# Patient Record
Sex: MALE | Race: WHITE | Employment: UNEMPLOYED | URBAN - METROPOLITAN AREA
[De-identification: names, ages, dates, MRNs, and addresses within clinical notes are randomized per-mention and may not be internally consistent; named-entity substitution may affect disease eponyms.]

---

## 2019-05-01 ENCOUNTER — OFFICE VISIT (OUTPATIENT)
Dept: PSYCHOLOGY | Age: 29
End: 2019-05-01
Payer: COMMERCIAL

## 2019-05-01 ENCOUNTER — OFFICE VISIT (OUTPATIENT)
Dept: INTERNAL MEDICINE CLINIC | Age: 29
End: 2019-05-01
Payer: COMMERCIAL

## 2019-05-01 VITALS
DIASTOLIC BLOOD PRESSURE: 80 MMHG | HEIGHT: 69 IN | BODY MASS INDEX: 39.55 KG/M2 | SYSTOLIC BLOOD PRESSURE: 120 MMHG | WEIGHT: 267 LBS | HEART RATE: 80 BPM

## 2019-05-01 DIAGNOSIS — F12.90 MARIJUANA USE: ICD-10-CM

## 2019-05-01 DIAGNOSIS — F43.29 ADJUSTMENT DISORDER WITH EMOTIONAL DISTURBANCE: ICD-10-CM

## 2019-05-01 DIAGNOSIS — G47.33 OSA (OBSTRUCTIVE SLEEP APNEA): ICD-10-CM

## 2019-05-01 DIAGNOSIS — F12.20 CANNABIS USE DISORDER, MODERATE, DEPENDENCE (HCC): ICD-10-CM

## 2019-05-01 DIAGNOSIS — E66.9 CLASS 2 OBESITY WITH BODY MASS INDEX (BMI) OF 39.0 TO 39.9 IN ADULT, UNSPECIFIED OBESITY TYPE, UNSPECIFIED WHETHER SERIOUS COMORBIDITY PRESENT: ICD-10-CM

## 2019-05-01 DIAGNOSIS — F33.1 MODERATE EPISODE OF RECURRENT MAJOR DEPRESSIVE DISORDER (HCC): Primary | ICD-10-CM

## 2019-05-01 DIAGNOSIS — F32.A DEPRESSION, UNSPECIFIED DEPRESSION TYPE: ICD-10-CM

## 2019-05-01 DIAGNOSIS — Z13.31 POSITIVE DEPRESSION SCREENING: ICD-10-CM

## 2019-05-01 DIAGNOSIS — F41.1 GAD (GENERALIZED ANXIETY DISORDER): ICD-10-CM

## 2019-05-01 DIAGNOSIS — F32.A DEPRESSION, UNSPECIFIED DEPRESSION TYPE: Primary | ICD-10-CM

## 2019-05-01 LAB
A/G RATIO: 1.6 (ref 1.1–2.2)
ALBUMIN SERPL-MCNC: 4.5 G/DL (ref 3.4–5)
ALP BLD-CCNC: 55 U/L (ref 40–129)
ALT SERPL-CCNC: 25 U/L (ref 10–40)
ANION GAP SERPL CALCULATED.3IONS-SCNC: 13 MMOL/L (ref 3–16)
AST SERPL-CCNC: 20 U/L (ref 15–37)
BILIRUB SERPL-MCNC: 0.5 MG/DL (ref 0–1)
BILIRUBIN URINE: NEGATIVE
BLOOD, URINE: NEGATIVE
BUN BLDV-MCNC: 14 MG/DL (ref 7–20)
CALCIUM SERPL-MCNC: 9.4 MG/DL (ref 8.3–10.6)
CHLORIDE BLD-SCNC: 101 MMOL/L (ref 99–110)
CLARITY: CLEAR
CO2: 24 MMOL/L (ref 21–32)
COLOR: YELLOW
CREAT SERPL-MCNC: 0.9 MG/DL (ref 0.9–1.3)
FOLATE: 8.15 NG/ML (ref 4.78–24.2)
GFR AFRICAN AMERICAN: >60
GFR NON-AFRICAN AMERICAN: >60
GLOBULIN: 2.9 G/DL
GLUCOSE BLD-MCNC: 92 MG/DL (ref 70–99)
GLUCOSE URINE: NEGATIVE MG/DL
HCT VFR BLD CALC: 43.8 % (ref 40.5–52.5)
HEMOGLOBIN: 14.6 G/DL (ref 13.5–17.5)
KETONES, URINE: NEGATIVE MG/DL
LEUKOCYTE ESTERASE, URINE: NEGATIVE
MCH RBC QN AUTO: 28.6 PG (ref 26–34)
MCHC RBC AUTO-ENTMCNC: 33.3 G/DL (ref 31–36)
MCV RBC AUTO: 85.9 FL (ref 80–100)
MICROSCOPIC EXAMINATION: NORMAL
NITRITE, URINE: NEGATIVE
PDW BLD-RTO: 13 % (ref 12.4–15.4)
PH UA: 5.5 (ref 5–8)
PLATELET # BLD: 253 K/UL (ref 135–450)
PMV BLD AUTO: 9.4 FL (ref 5–10.5)
POTASSIUM SERPL-SCNC: 4 MMOL/L (ref 3.5–5.1)
PROTEIN UA: NEGATIVE MG/DL
RBC # BLD: 5.1 M/UL (ref 4.2–5.9)
SODIUM BLD-SCNC: 138 MMOL/L (ref 136–145)
SPECIFIC GRAVITY UA: 1.03 (ref 1–1.03)
T4 FREE: 1 NG/DL (ref 0.9–1.8)
TOTAL PROTEIN: 7.4 G/DL (ref 6.4–8.2)
TSH REFLEX: 4.72 UIU/ML (ref 0.27–4.2)
URINE TYPE: NORMAL
UROBILINOGEN, URINE: 0.2 E.U./DL
VITAMIN B-12: 725 PG/ML (ref 211–911)
WBC # BLD: 8.5 K/UL (ref 4–11)

## 2019-05-01 PROCEDURE — 96160 PT-FOCUSED HLTH RISK ASSMT: CPT | Performed by: INTERNAL MEDICINE

## 2019-05-01 PROCEDURE — 99203 OFFICE O/P NEW LOW 30 MIN: CPT | Performed by: INTERNAL MEDICINE

## 2019-05-01 PROCEDURE — 90791 PSYCH DIAGNOSTIC EVALUATION: CPT | Performed by: PSYCHOLOGIST

## 2019-05-01 PROCEDURE — G8431 POS CLIN DEPRES SCRN F/U DOC: HCPCS | Performed by: INTERNAL MEDICINE

## 2019-05-01 ASSESSMENT — ENCOUNTER SYMPTOMS
SHORTNESS OF BREATH: 0
VOICE CHANGE: 0
NAUSEA: 0
TROUBLE SWALLOWING: 0
ABDOMINAL PAIN: 0
CHEST TIGHTNESS: 0
WHEEZING: 0

## 2019-05-01 ASSESSMENT — PATIENT HEALTH QUESTIONNAIRE - PHQ9
SUM OF ALL RESPONSES TO PHQ9 QUESTIONS 1 & 2: 5
SUM OF ALL RESPONSES TO PHQ QUESTIONS 1-9: 22
6. FEELING BAD ABOUT YOURSELF - OR THAT YOU ARE A FAILURE OR HAVE LET YOURSELF OR YOUR FAMILY DOWN: 3
7. TROUBLE CONCENTRATING ON THINGS, SUCH AS READING THE NEWSPAPER OR WATCHING TELEVISION: 3
2. FEELING DOWN, DEPRESSED OR HOPELESS: 2
10. IF YOU CHECKED OFF ANY PROBLEMS, HOW DIFFICULT HAVE THESE PROBLEMS MADE IT FOR YOU TO DO YOUR WORK, TAKE CARE OF THINGS AT HOME, OR GET ALONG WITH OTHER PEOPLE: 2
4. FEELING TIRED OR HAVING LITTLE ENERGY: 3
5. POOR APPETITE OR OVEREATING: 3
8. MOVING OR SPEAKING SO SLOWLY THAT OTHER PEOPLE COULD HAVE NOTICED. OR THE OPPOSITE, BEING SO FIGETY OR RESTLESS THAT YOU HAVE BEEN MOVING AROUND A LOT MORE THAN USUAL: 3
3. TROUBLE FALLING OR STAYING ASLEEP: 1
1. LITTLE INTEREST OR PLEASURE IN DOING THINGS: 3
9. THOUGHTS THAT YOU WOULD BE BETTER OFF DEAD, OR OF HURTING YOURSELF: 1
SUM OF ALL RESPONSES TO PHQ QUESTIONS 1-9: 22

## 2019-05-01 NOTE — PROGRESS NOTES
Attends meetings of clubs or organizations: None     Relationship status: None    Intimate partner violence:     Fear of current or ex partner: None     Emotionally abused: None     Physically abused: None     Forced sexual activity: None   Other Topics Concern    None   Social History Narrative    None     No family history on file. Review of Systems   Constitutional: Positive for fatigue. Negative for appetite change. Anxiety attack   HENT: Negative for congestion, trouble swallowing and voice change. Respiratory: Negative for chest tightness, shortness of breath and wheezing. Cardiovascular: Negative for chest pain and palpitations. Gastrointestinal: Negative for abdominal pain and nausea. Endocrine: Negative for polyuria. Genitourinary: Positive for frequency. Neurological:        Chronic memory issue. Psychiatric/Behavioral: Positive for decreased concentration and sleep disturbance. The patient is nervous/anxious. OBJECTIVE:  Pulse Readings from Last 4 Encounters:   05/01/19 80     Wt Readings from Last 4 Encounters:   05/01/19 267 lb (121.1 kg)     BP Readings from Last 4 Encounters:   05/01/19 120/80     Physical Exam   Constitutional: He is oriented to person, place, and time. He appears well-developed. He appears distressed. Patient is constantly crying at the bedside  Positive obesity   HENT:   Head: Normocephalic and atraumatic. Eyes: Pupils are equal, round, and reactive to light. Conjunctivae and EOM are normal.   Neck: Normal range of motion. Neck supple. Cardiovascular: Normal rate, regular rhythm and normal heart sounds. No murmur heard. Pulmonary/Chest: Effort normal and breath sounds normal. No respiratory distress. He has no wheezes. Abdominal: Soft. Bowel sounds are normal. There is no tenderness. There is no rebound. Musculoskeletal: Normal range of motion. He exhibits no edema or tenderness.    Neurological: He is alert and oriented to person, place, and time. He exhibits normal muscle tone. Skin: Skin is warm and dry. No rash noted. No erythema. Psychiatric: He has a normal mood and affect. His behavior is normal. Thought content normal.   Nursing note and vitals reviewed. ASSESSMENT/PLAN:    1. Depression, unspecified depression type    2. Adjustment disorder with emotional disturbance    3. Marijuana use    4. ARIEL (obstructive sleep apnea)    5. Class 2 obesity with body mass index (BMI) of 39.0 to 39.9 in adult, unspecified obesity type, unspecified whether serious comorbidity present    6.  Positive depression screening            Orders Placed This Encounter   Procedures    CBC     Standing Status:   Future     Number of Occurrences:   1     Standing Expiration Date:   5/1/2020    COMPREHENSIVE METABOLIC PANEL     Standing Status:   Future     Number of Occurrences:   1     Standing Expiration Date:   5/1/2020    TSH with Reflex     Standing Status:   Future     Number of Occurrences:   1     Standing Expiration Date:   5/1/2020    VITAMIN B12 & FOLATE     Standing Status:   Future     Number of Occurrences:   1     Standing Expiration Date:   5/1/2020    Urinalysis     Standing Status:   Future     Number of Occurrences:   1     Standing Expiration Date:   4/30/2020    Drug Panel-PM-HI Res-UR Interp-A     Standing Status:   Future     Number of Occurrences:   1     Standing Expiration Date:   4/30/2020    Ambulatory referral to Psychology     Referral Priority:   Routine     Referral Type:   Consult for Advice and Opinion     Referral Reason:   Specialty Services Required     Referred to Provider:   Heidi Jenkins, PhD     Requested Specialty:   Psychology     Number of Visits Requested:   1    Ambulatory referral to Sleep Medicine     Referral Priority:   Routine     Referral Type:   Consult for Advice and Opinion     Referral Reason:   Specialty Services Required     Referred to Provider:   Jasiel Jones MD Number of Visits Requested:   1    Positive Screen for Clinical Depression with a Documented Follow-up Plan      Current Outpatient Medications   Medication Sig Dispense Refill    sertraline (ZOLOFT) 50 MG tablet Take 1 tablet by mouth daily 30 tablet 1     No current facility-administered medications for this visit. On the basis of positive PHQ-9 screening (PHQ-9 Total Score: 22), the following plan was implemented: referral for psychotherapy provided to address external stressors and medication prescribed: Zoloft- 50 mg/day- patient will call for any significant medication side effects or worsening symptoms of depression. Patient will follow-up in 2 week(s) with PCP.
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
## 2019-05-01 NOTE — PATIENT INSTRUCTIONS
selegiline, and tranylcypromine. To make sure sertraline is safe for you, tell your doctor if you have ever had:  · heart disease, high blood pressure, or a stroke;  · liver or kidney disease;  · a seizure;  · bleeding problems, or if you take warfarin (Coumadin, Jantoven);  · bipolar disorder (manic depression); or  · low levels of sodium in your blood. Some medicines can interact with sertraline and cause a serious condition called serotonin syndrome. Be sure your doctor knows if you also take stimulant medicine, opioid medicine, herbal products, other antidepressants, or medicine for mental illness, Parkinson's disease, migraine headaches, serious infections, or prevention of nausea and vomiting. Ask your doctor before making any changes in how or when you take your medications. Some young people have thoughts about suicide when first taking an antidepressant. Your doctor should check your progress at regular visits. Your family or other caregivers should also be alert to changes in your mood or symptoms. Taking an SSRI antidepressant during pregnancy may cause serious lung problems or other complications in the baby. However, you may have a relapse of depression if you stop taking your antidepressant. Tell your doctor right away if you become pregnant. Do not start or stop taking this medicine during pregnancy without your doctor's advice. It is not known whether sertraline passes into breast milk or if it could harm a nursing baby. Tell your doctor if you are breast-feeding a baby. Do not give sertraline to anyone younger than 25years old without the advice of a doctor. Sertraline is FDA-approved for children with obsessive-compulsive disorder (OCD). It is not approved for treating depression in children. How should I take sertraline? Follow all directions on your prescription label. Your doctor may occasionally change your dose.  Do not take this medicine in larger or smaller amounts or for longer than recommended. Sertraline may be taken with or without food. Try to take the medicine at the same time each day. The liquid (oral concentrate) form of sertraline must be diluted before you take it. To be sure you get the correct dose, measure the liquid with the medicine dropper provided. Mix the dose with 4 ounces (one-half cup) of water, ginger ale, lemon/lime soda, lemonade, or orange juice. Do not use any other liquids to dilute the medicine. Stir this mixture and drink all of it right away. To make sure you get the entire dose, add a little more water to the same glass, swirl gently and drink right away. This medicine can cause you to have a false positive drug screening test. If you provide a urine sample for drug screening, tell the laboratory staff that you are taking sertraline. It may take up to 4 weeks before your symptoms improve. Keep using the medication as directed and tell your doctor if your symptoms do not improve. Do not stop using sertraline suddenly, or you could have unpleasant withdrawal symptoms. Ask your doctor how to safely stop using sertraline. Store at room temperature away from moisture and heat. What happens if I miss a dose? Take the missed dose as soon as you remember. Skip the missed dose if it is almost time for your next scheduled dose. Do not take extra medicine to make up the missed dose. What happens if I overdose? Seek emergency medical attention or call the Poison Help line at 1-244.835.9979. What should I avoid while taking sertraline? Do not drink alcohol. Ask your doctor before taking a nonsteroidal anti-inflammatory drug (NSAID) for pain, arthritis, fever, or swelling. This includes aspirin, ibuprofen (Advil, Motrin), naproxen (Aleve), celecoxib (Celebrex), diclofenac, indomethacin, meloxicam, and others. Using an NSAID with sertraline may cause you to bruise or bleed easily. This medication may impair your thinking or reactions.  Be careful if you drive or do anything that requires you to be alert. What are the possible side effects of sertraline? Get emergency medical help if you have signs of an allergic reaction: skin rash or hives (with or without fever or joint pain); difficulty breathing; swelling of your face, lips, tongue, or throat. Report any new or worsening symptoms to your doctor, such as: mood or behavior changes, anxiety, panic attacks, trouble sleeping, or if you feel impulsive, irritable, agitated, hostile, aggressive, restless, hyperactive (mentally or physically), more depressed, or have thoughts about suicide or hurting yourself. Call your doctor at once if you have:  · a seizure (convulsions);  · blurred vision, tunnel vision, eye pain or swelling;  · low levels of sodium in the body --headache, confusion, memory problems, severe weakness, feeling unsteady; or  · manic episodes --racing thoughts, increased energy, unusual risk-taking behavior, extreme happiness, being irritable or talkative. Seek medical attention right away if you have symptoms of serotonin syndrome, such as: agitation, hallucinations, fever, sweating, shivering, fast heart rate, muscle stiffness, twitching, loss of coordination, nausea, vomiting, or diarrhea. Common side effects may include:  · drowsiness, tiredness, feeling anxious or agitated;  · indigestion, nausea, diarrhea, loss of appetite;  · sweating;  · tremors or shaking;  · sleep problems (insomnia); or  · decreased sex drive, impotence, or difficulty having an orgasm. This is not a complete list of side effects and others may occur. Call your doctor for medical advice about side effects. You may report side effects to FDA at 2-045-FDA-2014. What other drugs will affect sertraline? Taking sertraline with other drugs that make you sleepy can worsen this effect. Ask your doctor before taking a sleeping pill, narcotic medication, muscle relaxer, or medicine for anxiety, depression, or seizures.   Other drugs may interact with sertraline, including prescription and over-the-counter medicines, vitamins, and herbal products. Tell your doctor about all your current medicines and any medicine you start or stop using. Where can I get more information? Your pharmacist can provide more information about sertraline. Remember, keep this and all other medicines out of the reach of children, never share your medicines with others, and use this medication only for the indication prescribed. Every effort has been made to ensure that the information provided by [address] is accurate, up-to-date, and complete, but no guarantee is made to that effect. Drug information contained herein may be time sensitive. [facility] information has been compiled for use by healthcare practitioners and consumers in the [place] and therefore [facility] does not warrant that uses outside of the [place] are appropriate, unless specifically indicated otherwise. [facility]'s drug information does not endorse drugs, diagnose patients or recommend therapy. [facility] [employer] drug information is an informational resource designed to assist licensed healthcare practitioners in caring for their patients and/or to serve consumers viewing this service as a supplement to, and not a substitute for, the expertise, skill, knowledge and judgment of healthcare practitioners. The absence of a warning for a given drug or drug combination in no way should be construed to indicate that the drug or drug combination is safe, effective or appropriate for any given patient. [facility] does not assume any responsibility for any aspect of healthcare administered with the aid of information [facility] provides. The information contained herein is not intended to cover all possible uses, directions, precautions, warnings, drug interactions, allergic reactions, or adverse effects.  If you have questions about the drugs you are taking, check with your doctor, nurse or pharmacist.  Copyright 3232-4425 [employer] Version: 21.01. Revision date: 8/9/2017. Care instructions adapted under license by [facility] ([facility]). If you have questions about a medical condition or this instruction, always ask your healthcare professional. [address] any warranty or liability for your use of this information.

## 2019-05-02 DIAGNOSIS — E03.9 ACQUIRED HYPOTHYROIDISM: Primary | ICD-10-CM

## 2019-05-02 RX ORDER — LEVOTHYROXINE SODIUM 0.05 MG/1
50 TABLET ORAL DAILY
Qty: 30 TABLET | Refills: 5 | Status: SHIPPED | OUTPATIENT
Start: 2019-05-02 | End: 2019-10-16 | Stop reason: SDUPTHER

## 2019-05-06 LAB
6-ACETYLMORPHINE: NOT DETECTED
7-AMINOCLONAZEPAM: NOT DETECTED
ALPHA-OH-ALPRAZOLAM: NOT DETECTED
ALPRAZOLAM: NOT DETECTED
AMPHETAMINE: NOT DETECTED
BARBITURATES: NOT DETECTED
BENZOYLECGONINE: NOT DETECTED
BUPRENORPHINE: NOT DETECTED
CARISOPRODOL: NOT DETECTED
CLONAZEPAM: NOT DETECTED
CODEINE: NOT DETECTED
CREATININE URINE: 285.1 MG/DL (ref 20–400)
DIAZEPAM: NOT DETECTED
DRUGS EXPECTED: NORMAL
EER PAIN MGT DRUG PANEL, HIGH RES/EMIT U: NORMAL
ETHYL GLUCURONIDE: NOT DETECTED
FENTANYL: NOT DETECTED
HYDROCODONE: NOT DETECTED
HYDROMORPHONE: NOT DETECTED
LORAZEPAM: NOT DETECTED
MARIJUANA METABOLITE: PRESENT
MDA: NOT DETECTED
MDEA: NOT DETECTED
MDMA URINE: NOT DETECTED
MEPERIDINE: NOT DETECTED
METHADONE: NOT DETECTED
METHAMPHETAMINE: NOT DETECTED
METHYLPHENIDATE: NOT DETECTED
MIDAZOLAM: NOT DETECTED
MORPHINE: NOT DETECTED
NORBUPRENORPHINE, FREE: NOT DETECTED
NORDIAZEPAM: NOT DETECTED
NORFENTANYL: NOT DETECTED
NORHYDROCODONE, URINE: NOT DETECTED
NOROXYCODONE: NOT DETECTED
NOROXYMORPHONE, URINE: NOT DETECTED
OXAZEPAM: NOT DETECTED
OXYCODONE: NOT DETECTED
OXYMORPHONE: NOT DETECTED
PAIN MANAGEMENT DRUG PANEL: NORMAL
PAIN MANAGEMENT DRUG PANEL: NORMAL
PCP: NOT DETECTED
PHENTERMINE: NOT DETECTED
PROPOXYPHENE: NOT DETECTED
TAPENTADOL, URINE: NOT DETECTED
TAPENTADOL-O-SULFATE, URINE: NOT DETECTED
TEMAZEPAM: NOT DETECTED
TRAMADOL: NOT DETECTED
ZOLPIDEM: NOT DETECTED

## 2019-05-08 PROBLEM — F33.1 MODERATE EPISODE OF RECURRENT MAJOR DEPRESSIVE DISORDER (HCC): Status: ACTIVE | Noted: 2019-05-08

## 2019-05-08 PROBLEM — F41.1 GAD (GENERALIZED ANXIETY DISORDER): Status: ACTIVE | Noted: 2019-05-08

## 2019-05-08 PROBLEM — F12.20 CANNABIS USE DISORDER, MODERATE, DEPENDENCE (HCC): Status: ACTIVE | Noted: 2019-05-08

## 2019-05-15 ENCOUNTER — OFFICE VISIT (OUTPATIENT)
Dept: PSYCHOLOGY | Age: 29
End: 2019-05-15
Payer: COMMERCIAL

## 2019-05-15 ENCOUNTER — OFFICE VISIT (OUTPATIENT)
Dept: INTERNAL MEDICINE CLINIC | Age: 29
End: 2019-05-15
Payer: COMMERCIAL

## 2019-05-15 VITALS
DIASTOLIC BLOOD PRESSURE: 88 MMHG | HEART RATE: 80 BPM | HEIGHT: 69 IN | WEIGHT: 262 LBS | BODY MASS INDEX: 38.8 KG/M2 | SYSTOLIC BLOOD PRESSURE: 132 MMHG

## 2019-05-15 DIAGNOSIS — F41.1 GAD (GENERALIZED ANXIETY DISORDER): Primary | ICD-10-CM

## 2019-05-15 DIAGNOSIS — F43.29 ADJUSTMENT DISORDER WITH EMOTIONAL DISTURBANCE: ICD-10-CM

## 2019-05-15 DIAGNOSIS — F41.1 GAD (GENERALIZED ANXIETY DISORDER): ICD-10-CM

## 2019-05-15 DIAGNOSIS — F33.1 MODERATE EPISODE OF RECURRENT MAJOR DEPRESSIVE DISORDER (HCC): Primary | ICD-10-CM

## 2019-05-15 DIAGNOSIS — F12.20: ICD-10-CM

## 2019-05-15 DIAGNOSIS — F32.A DEPRESSION, UNSPECIFIED DEPRESSION TYPE: ICD-10-CM

## 2019-05-15 PROCEDURE — 99213 OFFICE O/P EST LOW 20 MIN: CPT | Performed by: INTERNAL MEDICINE

## 2019-05-15 PROCEDURE — 96160 PT-FOCUSED HLTH RISK ASSMT: CPT | Performed by: INTERNAL MEDICINE

## 2019-05-15 PROCEDURE — 90832 PSYTX W PT 30 MINUTES: CPT | Performed by: PSYCHOLOGIST

## 2019-05-15 ASSESSMENT — PATIENT HEALTH QUESTIONNAIRE - PHQ9
SUM OF ALL RESPONSES TO PHQ9 QUESTIONS 1 & 2: 3
2. FEELING DOWN, DEPRESSED OR HOPELESS: 1
SUM OF ALL RESPONSES TO PHQ QUESTIONS 1-9: 8
9. THOUGHTS THAT YOU WOULD BE BETTER OFF DEAD, OR OF HURTING YOURSELF: 0
10. IF YOU CHECKED OFF ANY PROBLEMS, HOW DIFFICULT HAVE THESE PROBLEMS MADE IT FOR YOU TO DO YOUR WORK, TAKE CARE OF THINGS AT HOME, OR GET ALONG WITH OTHER PEOPLE: 1
5. POOR APPETITE OR OVEREATING: 0
3. TROUBLE FALLING OR STAYING ASLEEP: 1
1. LITTLE INTEREST OR PLEASURE IN DOING THINGS: 2
4. FEELING TIRED OR HAVING LITTLE ENERGY: 1
8. MOVING OR SPEAKING SO SLOWLY THAT OTHER PEOPLE COULD HAVE NOTICED. OR THE OPPOSITE, BEING SO FIGETY OR RESTLESS THAT YOU HAVE BEEN MOVING AROUND A LOT MORE THAN USUAL: 1
7. TROUBLE CONCENTRATING ON THINGS, SUCH AS READING THE NEWSPAPER OR WATCHING TELEVISION: 2
SUM OF ALL RESPONSES TO PHQ QUESTIONS 1-9: 8

## 2019-05-15 ASSESSMENT — ENCOUNTER SYMPTOMS
VOMITING: 0
SHORTNESS OF BREATH: 0
WHEEZING: 0
NAUSEA: 0
ANAL BLEEDING: 0

## 2019-05-15 NOTE — PROGRESS NOTES
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   Social Needs    Financial resource strain: Not on file    Food insecurity:     Worry: Not on file     Inability: Not on file   [place] needs:     Medical: Not on file     Non-medical: Not on file   Tobacco Use    Smoking status: Never Smoker    Smokeless tobacco: Never Used   Substance and Sexual Activity    Alcohol use: Not Currently    Drug use: Yes     Types: Marijuana     Comment: daily    Sexual activity: Yes     Partners: Female   Lifestyle    Physical activity:     Days per week: Not on file     Minutes per session: Not on file    Stress: Not on file   Relationships    Social connections:     Talks on phone: Not on file     Gets together: Not on file     Attends Rastafari service: Not on file     Active member of club or organization: Not on file     Attends meetings of clubs or organizations: Not on file     Relationship status: Not on file    Intimate partner violence:     Fear of current or ex partner: Not on file     Emotionally abused: Not on file     Physically abused: Not on file     Forced sexual activity: Not on file   Other Topics Concern    Not on file   Social History Narrative    Siblings each with different father. Mother/Father with substance abuse. Feels his step father only positive male influence. Hx of drug abuse himself. Now smoking marijuana on daily basis. Unable to keep job due to failed drug screens. 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 Has goal to go to school to be blabfeed. TOBACCO:   reports that he has never smoked. He has never used smokeless tobacco.  ETOH:   reports that he drank alcohol. A:  Administered PHQ-9 (see below). Patient endorses mild symptoms of depression. Denied SI/HI.      PHQ Scores 5/15/2019 5/1/2019   PHQ2 Score 3 5   PHQ9 Score 8 22     Interpretation of Total Score Depression Severity: 1-4 = Minimal depression, 5-9 = Mild depression, 10-14 = Moderate depression, 15-19 = Moderately severe depression, 20-27 = Severe depression        Diagnosis:  Major depressive disorder; recurrent and moderate  Generalized anxiety disorder  Cannabis Use Disorder      Plan:  Pt interventions:  CBT to target cognitive flexibility and Identified maladaptive thoughts        Documentation was done using voice recognition dragon software. Every effort was made to ensure accuracy; however, inadvertent, unintentional computerized transcription errors may be present.

## 2019-05-15 NOTE — PROGRESS NOTES
05/01/2019    BUN 14 05/01/2019    CREATININE 0.9 05/01/2019    GFRAA >60 05/01/2019    CALCIUM 9.4 05/01/2019    PROT 7.4 05/01/2019    LABALBU 4.5 05/01/2019    AGRATIO 1.6 05/01/2019    BILITOT 0.5 05/01/2019    ALKPHOS 55 05/01/2019    ALT 25 05/01/2019    AST 20 05/01/2019    GLOB 2.9 05/01/2019     URINALYSIS:  Lab Results   Component Value Date    GLUCOSEU Negative 05/01/2019    KETUA Negative 05/01/2019    SPECGRAV 1.026 05/01/2019    BLOODU Negative 05/01/2019    PHUR 5.5 05/01/2019    PROTEINU Negative 05/01/2019    NITRU Negative 05/01/2019    LEUKOCYTESUR Negative 05/01/2019    LABMICR Not Indicated 05/01/2019    URINETYPE Clean catch 05/01/2019     MICRO/ALB:   Lab Results   Component Value Date    LABMICR Not Indicated 05/01/2019    LABCREA 285.1 05/01/2019     TSH:   Lab Results   Component Value Date    TSHREFLEX 4.72 05/01/2019       ASSESSMENT/PLAN:    [facility] was seen today for depression and weight loss. Diagnoses and all orders for this visit:    DILIA (generalized anxiety disorder)  Increase-     sertraline (ZOLOFT) 50 MG tablet; Take 1.5 tablets by mouth daily    Depression, unspecified depression type  Increase-     sertraline (ZOLOFT) 50 MG tablet; Take 1.5 tablets by mouth daily                  No orders of the defined types were placed in this encounter. Current Outpatient Medications   Medication Sig Dispense Refill    sertraline (ZOLOFT) 50 MG tablet Take 1.5 tablets by mouth daily 45 tablet 2    levothyroxine (SYNTHROID) 50 MCG tablet Take 1 tablet by mouth daily 30 tablet 5     No current facility-administered medications for this visit. Return in about 2 months (around 7/15/2019). An After Visit Summary was printed and given to the patient. Documentation was done using voice recognition dragon software. Every effort was made to ensure accuracy; however, inadvertent  Unintentional computerized transcription errors may be present.

## 2019-06-03 ENCOUNTER — OFFICE VISIT (OUTPATIENT)
Dept: PSYCHOLOGY | Age: 29
End: 2019-06-03
Payer: COMMERCIAL

## 2019-06-03 DIAGNOSIS — F41.1 GAD (GENERALIZED ANXIETY DISORDER): ICD-10-CM

## 2019-06-03 DIAGNOSIS — F33.1 MODERATE EPISODE OF RECURRENT MAJOR DEPRESSIVE DISORDER (HCC): Primary | ICD-10-CM

## 2019-06-03 PROCEDURE — 90832 PSYTX W PT 30 MINUTES: CPT | Performed by: PSYCHOLOGIST

## 2019-06-03 ASSESSMENT — PATIENT HEALTH QUESTIONNAIRE - PHQ9
5. POOR APPETITE OR OVEREATING: 0
SUM OF ALL RESPONSES TO PHQ9 QUESTIONS 1 & 2: 3
3. TROUBLE FALLING OR STAYING ASLEEP: 2
8. MOVING OR SPEAKING SO SLOWLY THAT OTHER PEOPLE COULD HAVE NOTICED. OR THE OPPOSITE, BEING SO FIGETY OR RESTLESS THAT YOU HAVE BEEN MOVING AROUND A LOT MORE THAN USUAL: 2
2. FEELING DOWN, DEPRESSED OR HOPELESS: 2
4. FEELING TIRED OR HAVING LITTLE ENERGY: 2
7. TROUBLE CONCENTRATING ON THINGS, SUCH AS READING THE NEWSPAPER OR WATCHING TELEVISION: 2
1. LITTLE INTEREST OR PLEASURE IN DOING THINGS: 1
10. IF YOU CHECKED OFF ANY PROBLEMS, HOW DIFFICULT HAVE THESE PROBLEMS MADE IT FOR YOU TO DO YOUR WORK, TAKE CARE OF THINGS AT HOME, OR GET ALONG WITH OTHER PEOPLE: 1
SUM OF ALL RESPONSES TO PHQ QUESTIONS 1-9: 13
6. FEELING BAD ABOUT YOURSELF - OR THAT YOU ARE A FAILURE OR HAVE LET YOURSELF OR YOUR FAMILY DOWN: 2
9. THOUGHTS THAT YOU WOULD BE BETTER OFF DEAD, OR OF HURTING YOURSELF: 0
SUM OF ALL RESPONSES TO PHQ QUESTIONS 1-9: 13

## 2019-06-03 NOTE — PROGRESS NOTES
Behavioral Health Consultation  Beka Carlisle, Ph.D.  Psychologist  6/3/2019  3:04 PM      Time spent with Patient: 30 minutes  This is patient's third  [facility] appointment. Reason for Consult:    Chief Complaint   Patient presents with    Depression       Feedback given to PCP. S:  Pt seen for f/u of depression and anxiety. Reported mood and sxs are generally stable. Reported he ran out of marijuana as intended, but after 3 days bought more. Now smoking in morning and night, but not as much during the day; intentionally keeping himself busy. Is feeling more motivated during the day. Desires to smoke if he is feeling \"edgy. \" Thinks trouble w sleep was r/t using drink mix that caffeine in it. Reported h/o strong seasonal pattern to his depression that also gets activated if there is a span of rainy days. Started researching school, but got bogged down in details, wants to take his SAT. Not sure why this matters to him, thinks it is to prove it to himself. Discussed possible role of IQ/LD/ADHD testing.  Discussed tx goals: some form of progress on schooling or career, would be socially more comfortable and less isolated    O:  MSE:    Appearance    alert, cooperative  Appetite normal  Sleep disturbance Yes  Fatigue Yes  Loss of pleasure Yes  Impulsive behavior Yes  Speech    spontaneous, normal rate, normal volume, well articulated and high productivity  Mood    Anxious  Depressed  Affect    anxiety  Thought Content    intact, excessive preoccupations, cognitive distortions and all or nothing thinking  Thought Process    goal directed and coherent  Associations    logical connections  Insight    Fair  Judgment    Intact  Orientation    oriented to person, place, time, and general circumstances  Memory    recent and remote memory intact  Attention/Concentration    impaired  Morbid ideation No  Suicide Assessment    no suicidal ideation    History:  Social History:   Social History     Socioeconomic History    Marital status: Single     Spouse name: Not on file    Number of children: Not on file   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   Social Needs    Financial resource strain: Not on file    Food insecurity:     Worry: Not on file     Inability: Not on file   DNA SEQ needs:     Medical: Not on file     Non-medical: Not on file   Tobacco Use    Smoking status: Never Smoker    Smokeless tobacco: Never Used   Substance and Sexual Activity    Alcohol use: Not Currently    Drug use: Yes     Types: Marijuana     Comment: daily    Sexual activity: Yes     Partners: Female   Lifestyle    Physical activity:     Days per week: Not on file     Minutes per session: Not on file    Stress: Not on file   Relationships    Social connections:     Talks on phone: Not on file     Gets together: Not on file     Attends Moravian service: Not on file     Active member of club or organization: Not on file     Attends meetings of clubs or organizations: Not on file     Relationship status: Not on file    Intimate partner violence:     Fear of current or ex partner: Not on file     Emotionally abused: Not on file     Physically abused: Not on file     Forced sexual activity: Not on file   Other Topics Concern    Not on file   Social History Narrative    Siblings each with different father. Mother/Father with substance abuse. Feels his step father only positive male influence. Hx of drug abuse himself. Now smoking marijuana on daily basis. Unable to keep job due to failed drug screens. 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 Has goal to go to school to be [employer]. TOBACCO:   reports that he has never smoked. He has never used smokeless tobacco.  ETOH:   reports that he drank alcohol. A:  Administered PHQ-9 (see below). Patient endorses moderate symptoms of depression. Denied SI/HI.      PHQ Scores 6/3/2019 5/15/2019 5/1/2019   PHQ2 Score 3 3 5   PHQ9 Score 13 8 22     Interpretation of Total Score Depression Severity: 1-4 = Minimal depression, 5-9 = Mild depression, 10-14 = Moderate depression, 15-19 = Moderately severe depression, 20-27 = Severe depression        Diagnosis:  Major depressive disorder; recurrent and moderate  Generalized anxiety disorder  Cannabis Use Disorder    Plan:  Pt interventions:  Identified maladaptive thoughts, Collaborative treatment planning,Clarified role of [facility] in primary care,Recommended that pt establish with a mental health clinician with whom they can meet regularly for psychotherapy services and Reviewed options for identifying appropriate providers        Documentation was done using voice recognition dragon software. Every effort was made to ensure accuracy; however, inadvertent, unintentional computerized transcription errors may be present.

## 2019-06-03 NOTE — PATIENT INSTRUCTIONS
[address]       Phone: [phone]      Information: Available for some insurances or out-of-pocket. Fees will be determined based on sliding scale. Individual, couple/marital, and assessment services. Website: [email or web address]    2. [facility]      Phone: [phone]      Information: Fees are based on income and range from $15-$50/session. Provide individual therapy, assessments (Learning Disorder and ADHD assessments: $500)      Website: [facility]    3. [facility]      Phone: [phone]       Information: Typically shorter-term (15 weeks), but may do long-term. Free intake, $25/session after that. Assessments are a flat fee of $100.       Website: [email or web address]

## 2019-06-03 NOTE — Clinical Note
Hello! He is curious about GeneSight testing, but I couldn't give him a sure answer about cost/insurance. Can you look into this and let him know? Thanks!

## 2019-06-07 ENCOUNTER — TELEPHONE (OUTPATIENT)
Dept: INTERNAL MEDICINE CLINIC | Age: 29
End: 2019-06-07

## 2019-06-07 NOTE — TELEPHONE ENCOUNTER
Pt had spoke to Dr. Nel Leos regarding [facility]ank testing. I called pt--lm that I would mail him the information. He is to call us if he wants to proceed.

## 2019-06-26 ENCOUNTER — OFFICE VISIT (OUTPATIENT)
Dept: PSYCHOLOGY | Age: 29
End: 2019-06-26
Payer: COMMERCIAL

## 2019-06-26 DIAGNOSIS — F41.1 GAD (GENERALIZED ANXIETY DISORDER): ICD-10-CM

## 2019-06-26 DIAGNOSIS — F33.1 MODERATE EPISODE OF RECURRENT MAJOR DEPRESSIVE DISORDER (HCC): Primary | ICD-10-CM

## 2019-06-26 DIAGNOSIS — F12.20 CANNABIS USE DISORDER, MODERATE, DEPENDENCE (HCC): ICD-10-CM

## 2019-06-26 PROCEDURE — 90832 PSYTX W PT 30 MINUTES: CPT | Performed by: PSYCHOLOGIST

## 2019-06-26 ASSESSMENT — PATIENT HEALTH QUESTIONNAIRE - PHQ9
SUM OF ALL RESPONSES TO PHQ QUESTIONS 1-9: 14
1. LITTLE INTEREST OR PLEASURE IN DOING THINGS: 2
3. TROUBLE FALLING OR STAYING ASLEEP: 2
SUM OF ALL RESPONSES TO PHQ9 QUESTIONS 1 & 2: 4
6. FEELING BAD ABOUT YOURSELF - OR THAT YOU ARE A FAILURE OR HAVE LET YOURSELF OR YOUR FAMILY DOWN: 2
SUM OF ALL RESPONSES TO PHQ QUESTIONS 1-9: 14
8. MOVING OR SPEAKING SO SLOWLY THAT OTHER PEOPLE COULD HAVE NOTICED. OR THE OPPOSITE, BEING SO FIGETY OR RESTLESS THAT YOU HAVE BEEN MOVING AROUND A LOT MORE THAN USUAL: 2
9. THOUGHTS THAT YOU WOULD BE BETTER OFF DEAD, OR OF HURTING YOURSELF: 0
4. FEELING TIRED OR HAVING LITTLE ENERGY: 2
5. POOR APPETITE OR OVEREATING: 0
7. TROUBLE CONCENTRATING ON THINGS, SUCH AS READING THE NEWSPAPER OR WATCHING TELEVISION: 2
2. FEELING DOWN, DEPRESSED OR HOPELESS: 2
10. IF YOU CHECKED OFF ANY PROBLEMS, HOW DIFFICULT HAVE THESE PROBLEMS MADE IT FOR YOU TO DO YOUR WORK, TAKE CARE OF THINGS AT HOME, OR GET ALONG WITH OTHER PEOPLE: 1

## 2019-07-22 ENCOUNTER — OFFICE VISIT (OUTPATIENT)
Dept: INTERNAL MEDICINE CLINIC | Age: 29
End: 2019-07-22
Payer: COMMERCIAL

## 2019-07-22 VITALS
WEIGHT: 258 LBS | BODY MASS INDEX: 38.21 KG/M2 | HEIGHT: 69 IN | SYSTOLIC BLOOD PRESSURE: 130 MMHG | HEART RATE: 84 BPM | DIASTOLIC BLOOD PRESSURE: 84 MMHG

## 2019-07-22 DIAGNOSIS — M10.9 GOUT OF LEFT FOOT, UNSPECIFIED CAUSE, UNSPECIFIED CHRONICITY: ICD-10-CM

## 2019-07-22 DIAGNOSIS — F41.1 GAD (GENERALIZED ANXIETY DISORDER): ICD-10-CM

## 2019-07-22 DIAGNOSIS — F32.A DEPRESSION, UNSPECIFIED DEPRESSION TYPE: Primary | ICD-10-CM

## 2019-07-22 DIAGNOSIS — E03.9 ACQUIRED HYPOTHYROIDISM: ICD-10-CM

## 2019-07-22 PROCEDURE — 99214 OFFICE O/P EST MOD 30 MIN: CPT | Performed by: INTERNAL MEDICINE

## 2019-07-22 ASSESSMENT — ENCOUNTER SYMPTOMS
PHOTOPHOBIA: 0
NAUSEA: 0
WHEEZING: 0
SHORTNESS OF BREATH: 0
VOMITING: 0
TROUBLE SWALLOWING: 0
ABDOMINAL PAIN: 0
VOICE CHANGE: 0

## 2019-07-23 LAB
TSH REFLEX: 1.43 UIU/ML (ref 0.27–4.2)
URIC ACID, SERUM: 6.4 MG/DL (ref 3.5–7.2)

## 2019-07-24 ENCOUNTER — OFFICE VISIT (OUTPATIENT)
Dept: PSYCHOLOGY | Age: 29
End: 2019-07-24
Payer: COMMERCIAL

## 2019-07-24 DIAGNOSIS — F41.1 GAD (GENERALIZED ANXIETY DISORDER): ICD-10-CM

## 2019-07-24 DIAGNOSIS — F33.1 MODERATE EPISODE OF RECURRENT MAJOR DEPRESSIVE DISORDER (HCC): Primary | ICD-10-CM

## 2019-07-24 DIAGNOSIS — F12.20 CANNABIS USE DISORDER, MODERATE, DEPENDENCE (HCC): ICD-10-CM

## 2019-07-24 PROCEDURE — 90832 PSYTX W PT 30 MINUTES: CPT | Performed by: PSYCHOLOGIST

## 2019-07-24 ASSESSMENT — PATIENT HEALTH QUESTIONNAIRE - PHQ9
8. MOVING OR SPEAKING SO SLOWLY THAT OTHER PEOPLE COULD HAVE NOTICED. OR THE OPPOSITE, BEING SO FIGETY OR RESTLESS THAT YOU HAVE BEEN MOVING AROUND A LOT MORE THAN USUAL: 1
SUM OF ALL RESPONSES TO PHQ QUESTIONS 1-9: 12
7. TROUBLE CONCENTRATING ON THINGS, SUCH AS READING THE NEWSPAPER OR WATCHING TELEVISION: 2
4. FEELING TIRED OR HAVING LITTLE ENERGY: 2
SUM OF ALL RESPONSES TO PHQ9 QUESTIONS 1 & 2: 2
6. FEELING BAD ABOUT YOURSELF - OR THAT YOU ARE A FAILURE OR HAVE LET YOURSELF OR YOUR FAMILY DOWN: 1
SUM OF ALL RESPONSES TO PHQ QUESTIONS 1-9: 12
2. FEELING DOWN, DEPRESSED OR HOPELESS: 1
5. POOR APPETITE OR OVEREATING: 2
10. IF YOU CHECKED OFF ANY PROBLEMS, HOW DIFFICULT HAVE THESE PROBLEMS MADE IT FOR YOU TO DO YOUR WORK, TAKE CARE OF THINGS AT HOME, OR GET ALONG WITH OTHER PEOPLE: 1
9. THOUGHTS THAT YOU WOULD BE BETTER OFF DEAD, OR OF HURTING YOURSELF: 0
1. LITTLE INTEREST OR PLEASURE IN DOING THINGS: 1
3. TROUBLE FALLING OR STAYING ASLEEP: 2

## 2019-07-24 NOTE — PROGRESS NOTES
Depression Severity: 1-4 = Minimal depression, 5-9 = Mild depression, 10-14 = Moderate depression, 15-19 = Moderately severe depression, 20-27 = Severe depression    Diagnosis:  Major depressive disorder; recurrent and moderate  Generalized anxiety disorder  Cannabis Use Disorder    Plan:  Pt interventions:  Trained in improving communication skills, Motivational Interviewing to determine importance and readiness for change and Supportive techniques        Documentation was done using voice recognition dragon software. Every effort was made to ensure accuracy; however, inadvertent, unintentional computerized transcription errors may be present.

## 2019-08-21 ENCOUNTER — OFFICE VISIT (OUTPATIENT)
Dept: PSYCHOLOGY | Age: 29
End: 2019-08-21
Payer: COMMERCIAL

## 2019-08-21 DIAGNOSIS — F33.1 MODERATE EPISODE OF RECURRENT MAJOR DEPRESSIVE DISORDER (HCC): Primary | ICD-10-CM

## 2019-08-21 DIAGNOSIS — F41.1 GAD (GENERALIZED ANXIETY DISORDER): ICD-10-CM

## 2019-08-21 DIAGNOSIS — F12.20 CANNABIS USE DISORDER, MODERATE, DEPENDENCE (HCC): ICD-10-CM

## 2019-08-21 PROCEDURE — 90832 PSYTX W PT 30 MINUTES: CPT | Performed by: PSYCHOLOGIST

## 2019-08-21 ASSESSMENT — PATIENT HEALTH QUESTIONNAIRE - PHQ9
1. LITTLE INTEREST OR PLEASURE IN DOING THINGS: 2
3. TROUBLE FALLING OR STAYING ASLEEP: 2
5. POOR APPETITE OR OVEREATING: 2
2. FEELING DOWN, DEPRESSED OR HOPELESS: 1
10. IF YOU CHECKED OFF ANY PROBLEMS, HOW DIFFICULT HAVE THESE PROBLEMS MADE IT FOR YOU TO DO YOUR WORK, TAKE CARE OF THINGS AT HOME, OR GET ALONG WITH OTHER PEOPLE: 1
8. MOVING OR SPEAKING SO SLOWLY THAT OTHER PEOPLE COULD HAVE NOTICED. OR THE OPPOSITE, BEING SO FIGETY OR RESTLESS THAT YOU HAVE BEEN MOVING AROUND A LOT MORE THAN USUAL: 2
7. TROUBLE CONCENTRATING ON THINGS, SUCH AS READING THE NEWSPAPER OR WATCHING TELEVISION: 1
6. FEELING BAD ABOUT YOURSELF - OR THAT YOU ARE A FAILURE OR HAVE LET YOURSELF OR YOUR FAMILY DOWN: 1
SUM OF ALL RESPONSES TO PHQ9 QUESTIONS 1 & 2: 3
4. FEELING TIRED OR HAVING LITTLE ENERGY: 1
SUM OF ALL RESPONSES TO PHQ QUESTIONS 1-9: 12
9. THOUGHTS THAT YOU WOULD BE BETTER OFF DEAD, OR OF HURTING YOURSELF: 0
SUM OF ALL RESPONSES TO PHQ QUESTIONS 1-9: 12

## 2019-08-21 NOTE — PROGRESS NOTES
Worry: Not on file     Inability: Not on file    Transportation needs:     Medical: Not on file     Non-medical: Not on file   Tobacco Use    Smoking status: Never Smoker    Smokeless tobacco: Never Used   Substance and Sexual Activity    Alcohol use: Not Currently    Drug use: Yes     Types: Marijuana     Comment: daily    Sexual activity: Yes     Partners: Female   Lifestyle    Physical activity:     Days per week: Not on file     Minutes per session: Not on file    Stress: Not on file   Relationships    Social connections:     Talks on phone: Not on file     Gets together: Not on file     Attends Advent service: Not on file     Active member of club or organization: Not on file     Attends meetings of clubs or organizations: Not on file     Relationship status: Not on file    Intimate partner violence:     Fear of current or ex partner: Not on file     Emotionally abused: Not on file     Physically abused: Not on file     Forced sexual activity: Not on file   Other Topics Concern    Not on file   Social History Narrative    Siblings each with different father. Mother/Father with substance abuse. Feels his step father only positive male influence. Hx of drug abuse himself. Now smoking marijuana on daily basis. Unable to keep job due to failed drug screens. 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 Has goal to go to school to be [employer]. TOBACCO:   reports that he has never smoked. He has never used smokeless tobacco.  ETOH:   reports that he drank alcohol. A:  Administered PHQ-9 (see below). Patient endorses moderate symptoms of depression. Denied SI/HI.      PHQ Scores 8/21/2019 7/24/2019 6/26/2019 6/3/2019 5/15/2019 5/1/2019   PHQ2 Score 3 2 4 3 3 5   PHQ9 Score 12 12 14 13 8 22     Interpretation of Total Score Depression Severity: 1-4 = Minimal depression, 5-9 = Mild depression, 10-14 = Moderate depression, 15-19 = Moderately severe depression, 20-27 = Severe

## 2019-09-25 ENCOUNTER — OFFICE VISIT (OUTPATIENT)
Dept: PSYCHOLOGY | Age: 29
End: 2019-09-25
Payer: COMMERCIAL

## 2019-09-25 DIAGNOSIS — F12.20 CANNABIS USE DISORDER, MODERATE, DEPENDENCE (HCC): ICD-10-CM

## 2019-09-25 DIAGNOSIS — F33.1 MODERATE EPISODE OF RECURRENT MAJOR DEPRESSIVE DISORDER (HCC): Primary | ICD-10-CM

## 2019-09-25 DIAGNOSIS — F41.1 GAD (GENERALIZED ANXIETY DISORDER): ICD-10-CM

## 2019-09-25 PROCEDURE — 90832 PSYTX W PT 30 MINUTES: CPT | Performed by: PSYCHOLOGIST

## 2019-09-25 ASSESSMENT — PATIENT HEALTH QUESTIONNAIRE - PHQ9
1. LITTLE INTEREST OR PLEASURE IN DOING THINGS: 1
9. THOUGHTS THAT YOU WOULD BE BETTER OFF DEAD, OR OF HURTING YOURSELF: 1
2. FEELING DOWN, DEPRESSED OR HOPELESS: 3
SUM OF ALL RESPONSES TO PHQ QUESTIONS 1-9: 15
SUM OF ALL RESPONSES TO PHQ QUESTIONS 1-9: 15
5. POOR APPETITE OR OVEREATING: 1
10. IF YOU CHECKED OFF ANY PROBLEMS, HOW DIFFICULT HAVE THESE PROBLEMS MADE IT FOR YOU TO DO YOUR WORK, TAKE CARE OF THINGS AT HOME, OR GET ALONG WITH OTHER PEOPLE: 1
4. FEELING TIRED OR HAVING LITTLE ENERGY: 2
8. MOVING OR SPEAKING SO SLOWLY THAT OTHER PEOPLE COULD HAVE NOTICED. OR THE OPPOSITE, BEING SO FIGETY OR RESTLESS THAT YOU HAVE BEEN MOVING AROUND A LOT MORE THAN USUAL: 2
6. FEELING BAD ABOUT YOURSELF - OR THAT YOU ARE A FAILURE OR HAVE LET YOURSELF OR YOUR FAMILY DOWN: 1
7. TROUBLE CONCENTRATING ON THINGS, SUCH AS READING THE NEWSPAPER OR WATCHING TELEVISION: 2
SUM OF ALL RESPONSES TO PHQ9 QUESTIONS 1 & 2: 4
3. TROUBLE FALLING OR STAYING ASLEEP: 2

## 2019-09-25 ASSESSMENT — COLUMBIA-SUICIDE SEVERITY RATING SCALE - C-SSRS
2. HAVE YOU ACTUALLY HAD ANY THOUGHTS OF KILLING YOURSELF?: NO
1. WITHIN THE PAST MONTH, HAVE YOU WISHED YOU WERE DEAD OR WISHED YOU COULD GO TO SLEEP AND NOT WAKE UP?: NO
6. HAVE YOU EVER DONE ANYTHING, STARTED TO DO ANYTHING, OR PREPARED TO DO ANYTHING TO END YOUR LIFE?: NO

## 2019-10-16 DIAGNOSIS — E03.9 ACQUIRED HYPOTHYROIDISM: ICD-10-CM

## 2019-10-17 RX ORDER — LEVOTHYROXINE SODIUM 0.05 MG/1
50 TABLET ORAL DAILY
Qty: 30 TABLET | Refills: 0 | Status: SHIPPED | OUTPATIENT
Start: 2019-10-17 | End: 2019-10-21 | Stop reason: SDUPTHER

## 2019-10-21 ENCOUNTER — OFFICE VISIT (OUTPATIENT)
Dept: INTERNAL MEDICINE CLINIC | Age: 29
End: 2019-10-21
Payer: COMMERCIAL

## 2019-10-21 VITALS
DIASTOLIC BLOOD PRESSURE: 88 MMHG | HEART RATE: 78 BPM | SYSTOLIC BLOOD PRESSURE: 136 MMHG | WEIGHT: 268 LBS | BODY MASS INDEX: 39.69 KG/M2 | HEIGHT: 69 IN

## 2019-10-21 DIAGNOSIS — Z23 NEED FOR INFLUENZA VACCINATION: ICD-10-CM

## 2019-10-21 DIAGNOSIS — F41.1 GAD (GENERALIZED ANXIETY DISORDER): ICD-10-CM

## 2019-10-21 DIAGNOSIS — F32.A DEPRESSION, UNSPECIFIED DEPRESSION TYPE: Primary | ICD-10-CM

## 2019-10-21 DIAGNOSIS — E03.9 ACQUIRED HYPOTHYROIDISM: ICD-10-CM

## 2019-10-21 PROCEDURE — 90471 IMMUNIZATION ADMIN: CPT | Performed by: INTERNAL MEDICINE

## 2019-10-21 PROCEDURE — 90686 IIV4 VACC NO PRSV 0.5 ML IM: CPT | Performed by: INTERNAL MEDICINE

## 2019-10-21 PROCEDURE — 99213 OFFICE O/P EST LOW 20 MIN: CPT | Performed by: INTERNAL MEDICINE

## 2019-10-21 RX ORDER — SERTRALINE HYDROCHLORIDE 100 MG/1
100 TABLET, FILM COATED ORAL DAILY
Qty: 30 TABLET | Refills: 2 | Status: SHIPPED | OUTPATIENT
Start: 2019-10-21 | End: 2019-11-18 | Stop reason: SDUPTHER

## 2019-10-21 RX ORDER — LEVOTHYROXINE SODIUM 0.05 MG/1
50 TABLET ORAL DAILY
Qty: 30 TABLET | Refills: 3 | Status: SHIPPED | OUTPATIENT
Start: 2019-10-21 | End: 2019-11-18 | Stop reason: SDUPTHER

## 2019-10-21 ASSESSMENT — ENCOUNTER SYMPTOMS
VOMITING: 0
SHORTNESS OF BREATH: 0
ABDOMINAL PAIN: 0
NAUSEA: 0
WHEEZING: 0

## 2019-10-30 ENCOUNTER — OFFICE VISIT (OUTPATIENT)
Dept: PSYCHOLOGY | Age: 29
End: 2019-10-30
Payer: COMMERCIAL

## 2019-10-30 DIAGNOSIS — F33.1 MODERATE EPISODE OF RECURRENT MAJOR DEPRESSIVE DISORDER (HCC): Primary | ICD-10-CM

## 2019-10-30 DIAGNOSIS — F41.1 GAD (GENERALIZED ANXIETY DISORDER): ICD-10-CM

## 2019-10-30 PROCEDURE — 90832 PSYTX W PT 30 MINUTES: CPT | Performed by: PSYCHOLOGIST

## 2019-10-30 ASSESSMENT — PATIENT HEALTH QUESTIONNAIRE - PHQ9
4. FEELING TIRED OR HAVING LITTLE ENERGY: 2
5. POOR APPETITE OR OVEREATING: 2
9. THOUGHTS THAT YOU WOULD BE BETTER OFF DEAD, OR OF HURTING YOURSELF: 0
8. MOVING OR SPEAKING SO SLOWLY THAT OTHER PEOPLE COULD HAVE NOTICED. OR THE OPPOSITE, BEING SO FIGETY OR RESTLESS THAT YOU HAVE BEEN MOVING AROUND A LOT MORE THAN USUAL: 2
SUM OF ALL RESPONSES TO PHQ QUESTIONS 1-9: 16
2. FEELING DOWN, DEPRESSED OR HOPELESS: 2
1. LITTLE INTEREST OR PLEASURE IN DOING THINGS: 3
SUM OF ALL RESPONSES TO PHQ9 QUESTIONS 1 & 2: 5
3. TROUBLE FALLING OR STAYING ASLEEP: 1
7. TROUBLE CONCENTRATING ON THINGS, SUCH AS READING THE NEWSPAPER OR WATCHING TELEVISION: 2
10. IF YOU CHECKED OFF ANY PROBLEMS, HOW DIFFICULT HAVE THESE PROBLEMS MADE IT FOR YOU TO DO YOUR WORK, TAKE CARE OF THINGS AT HOME, OR GET ALONG WITH OTHER PEOPLE: 2
6. FEELING BAD ABOUT YOURSELF - OR THAT YOU ARE A FAILURE OR HAVE LET YOURSELF OR YOUR FAMILY DOWN: 2
SUM OF ALL RESPONSES TO PHQ QUESTIONS 1-9: 16

## 2019-11-17 DIAGNOSIS — F41.1 GAD (GENERALIZED ANXIETY DISORDER): ICD-10-CM

## 2019-11-17 DIAGNOSIS — F32.A DEPRESSION, UNSPECIFIED DEPRESSION TYPE: ICD-10-CM

## 2019-11-18 DIAGNOSIS — E03.9 ACQUIRED HYPOTHYROIDISM: ICD-10-CM

## 2019-11-18 RX ORDER — LEVOTHYROXINE SODIUM 0.05 MG/1
50 TABLET ORAL DAILY
Qty: 30 TABLET | Refills: 0 | Status: SHIPPED | OUTPATIENT
Start: 2019-11-18 | End: 2020-04-21

## 2019-11-25 ENCOUNTER — OFFICE VISIT (OUTPATIENT)
Dept: PSYCHOLOGY | Age: 29
End: 2019-11-25
Payer: COMMERCIAL

## 2019-11-25 DIAGNOSIS — F33.1 MODERATE EPISODE OF RECURRENT MAJOR DEPRESSIVE DISORDER (HCC): Primary | ICD-10-CM

## 2019-11-25 DIAGNOSIS — F41.1 GAD (GENERALIZED ANXIETY DISORDER): ICD-10-CM

## 2019-11-25 PROCEDURE — 90832 PSYTX W PT 30 MINUTES: CPT | Performed by: PSYCHOLOGIST

## 2019-11-25 ASSESSMENT — PATIENT HEALTH QUESTIONNAIRE - PHQ9
4. FEELING TIRED OR HAVING LITTLE ENERGY: 3
6. FEELING BAD ABOUT YOURSELF - OR THAT YOU ARE A FAILURE OR HAVE LET YOURSELF OR YOUR FAMILY DOWN: 1
7. TROUBLE CONCENTRATING ON THINGS, SUCH AS READING THE NEWSPAPER OR WATCHING TELEVISION: 1
2. FEELING DOWN, DEPRESSED OR HOPELESS: 1
3. TROUBLE FALLING OR STAYING ASLEEP: 3
8. MOVING OR SPEAKING SO SLOWLY THAT OTHER PEOPLE COULD HAVE NOTICED. OR THE OPPOSITE, BEING SO FIGETY OR RESTLESS THAT YOU HAVE BEEN MOVING AROUND A LOT MORE THAN USUAL: 2
SUM OF ALL RESPONSES TO PHQ9 QUESTIONS 1 & 2: 2
5. POOR APPETITE OR OVEREATING: 2
SUM OF ALL RESPONSES TO PHQ QUESTIONS 1-9: 14
9. THOUGHTS THAT YOU WOULD BE BETTER OFF DEAD, OR OF HURTING YOURSELF: 0
10. IF YOU CHECKED OFF ANY PROBLEMS, HOW DIFFICULT HAVE THESE PROBLEMS MADE IT FOR YOU TO DO YOUR WORK, TAKE CARE OF THINGS AT HOME, OR GET ALONG WITH OTHER PEOPLE: 1
SUM OF ALL RESPONSES TO PHQ QUESTIONS 1-9: 14
1. LITTLE INTEREST OR PLEASURE IN DOING THINGS: 1

## 2019-12-03 ENCOUNTER — OFFICE VISIT (OUTPATIENT)
Dept: PULMONOLOGY | Age: 29
End: 2019-12-03
Payer: COMMERCIAL

## 2019-12-03 VITALS
DIASTOLIC BLOOD PRESSURE: 78 MMHG | BODY MASS INDEX: 41.62 KG/M2 | WEIGHT: 281 LBS | HEART RATE: 80 BPM | SYSTOLIC BLOOD PRESSURE: 120 MMHG | HEIGHT: 69 IN | OXYGEN SATURATION: 98 %

## 2019-12-03 DIAGNOSIS — E03.9 ACQUIRED HYPOTHYROIDISM: Chronic | ICD-10-CM

## 2019-12-03 DIAGNOSIS — F41.1 GAD (GENERALIZED ANXIETY DISORDER): Chronic | ICD-10-CM

## 2019-12-03 DIAGNOSIS — E66.01 MORBID OBESITY, UNSPECIFIED OBESITY TYPE (HCC): Chronic | ICD-10-CM

## 2019-12-03 DIAGNOSIS — G47.33 OBSTRUCTIVE SLEEP APNEA SYNDROME: Primary | ICD-10-CM

## 2019-12-03 PROBLEM — F33.1 MODERATE EPISODE OF RECURRENT MAJOR DEPRESSIVE DISORDER (HCC): Chronic | Status: ACTIVE | Noted: 2019-05-08

## 2019-12-03 PROCEDURE — 99244 OFF/OP CNSLTJ NEW/EST MOD 40: CPT | Performed by: INTERNAL MEDICINE

## 2019-12-03 ASSESSMENT — ENCOUNTER SYMPTOMS
ABDOMINAL DISTENTION: 0
PHOTOPHOBIA: 0
VOMITING: 0
ALLERGIC/IMMUNOLOGIC NEGATIVE: 1
SHORTNESS OF BREATH: 0
NAUSEA: 0
CHEST TIGHTNESS: 0
EYE PAIN: 0
RHINORRHEA: 0
ABDOMINAL PAIN: 0
CHOKING: 0
APNEA: 1

## 2019-12-03 ASSESSMENT — SLEEP AND FATIGUE QUESTIONNAIRES
NECK CIRCUMFERENCE (INCHES): 17
HOW LIKELY ARE YOU TO NOD OFF OR FALL ASLEEP WHILE SITTING QUIETLY AFTER LUNCH WITHOUT ALCOHOL: 3
HOW LIKELY ARE YOU TO NOD OFF OR FALL ASLEEP IN A CAR, WHILE STOPPED FOR A FEW MINUTES IN TRAFFIC: 0
HOW LIKELY ARE YOU TO NOD OFF OR FALL ASLEEP WHILE WATCHING TV: 3
HOW LIKELY ARE YOU TO NOD OFF OR FALL ASLEEP WHILE SITTING AND READING: 1
HOW LIKELY ARE YOU TO NOD OFF OR FALL ASLEEP WHILE SITTING INACTIVE IN A PUBLIC PLACE: 0
HOW LIKELY ARE YOU TO NOD OFF OR FALL ASLEEP WHEN YOU ARE A PASSENGER IN A CAR FOR AN HOUR WITHOUT A BREAK: 3
ESS TOTAL SCORE: 13
HOW LIKELY ARE YOU TO NOD OFF OR FALL ASLEEP WHILE SITTING AND TALKING TO SOMEONE: 0
HOW LIKELY ARE YOU TO NOD OFF OR FALL ASLEEP WHILE LYING DOWN TO REST IN THE AFTERNOON WHEN CIRCUMSTANCES PERMIT: 3

## 2019-12-04 ENCOUNTER — HOSPITAL ENCOUNTER (OUTPATIENT)
Dept: SLEEP CENTER | Age: 29
Discharge: HOME | End: 2019-12-04
Payer: MEDICARE

## 2019-12-04 DIAGNOSIS — G47.33 OBSTRUCTIVE SLEEP APNEA SYNDROME: ICD-10-CM

## 2019-12-04 PROCEDURE — 95811 POLYSOM 6/>YRS CPAP 4/> PARM: CPT | Performed by: INTERNAL MEDICINE

## 2019-12-04 PROCEDURE — 95811 POLYSOM 6/>YRS CPAP 4/> PARM: CPT

## 2019-12-11 ENCOUNTER — TELEPHONE (OUTPATIENT)
Dept: PULMONOLOGY | Age: 29
End: 2019-12-11

## 2019-12-16 ENCOUNTER — TELEPHONE (OUTPATIENT)
Dept: PULMONOLOGY | Age: 29
End: 2019-12-16

## 2019-12-17 ENCOUNTER — TELEPHONE (OUTPATIENT)
Dept: PULMONOLOGY | Age: 29
End: 2019-12-17

## 2019-12-30 ENCOUNTER — OFFICE VISIT (OUTPATIENT)
Dept: PSYCHOLOGY | Age: 29
End: 2019-12-30
Payer: COMMERCIAL

## 2019-12-30 PROCEDURE — 90832 PSYTX W PT 30 MINUTES: CPT | Performed by: PSYCHOLOGIST

## 2019-12-30 ASSESSMENT — PATIENT HEALTH QUESTIONNAIRE - PHQ9
6. FEELING BAD ABOUT YOURSELF - OR THAT YOU ARE A FAILURE OR HAVE LET YOURSELF OR YOUR FAMILY DOWN: 1
SUM OF ALL RESPONSES TO PHQ QUESTIONS 1-9: 15
3. TROUBLE FALLING OR STAYING ASLEEP: 3
5. POOR APPETITE OR OVEREATING: 1
8. MOVING OR SPEAKING SO SLOWLY THAT OTHER PEOPLE COULD HAVE NOTICED. OR THE OPPOSITE, BEING SO FIGETY OR RESTLESS THAT YOU HAVE BEEN MOVING AROUND A LOT MORE THAN USUAL: 1
4. FEELING TIRED OR HAVING LITTLE ENERGY: 3
1. LITTLE INTEREST OR PLEASURE IN DOING THINGS: 2
2. FEELING DOWN, DEPRESSED OR HOPELESS: 1
9. THOUGHTS THAT YOU WOULD BE BETTER OFF DEAD, OR OF HURTING YOURSELF: 1
7. TROUBLE CONCENTRATING ON THINGS, SUCH AS READING THE NEWSPAPER OR WATCHING TELEVISION: 2
SUM OF ALL RESPONSES TO PHQ9 QUESTIONS 1 & 2: 3
SUM OF ALL RESPONSES TO PHQ QUESTIONS 1-9: 15

## 2019-12-30 ASSESSMENT — COLUMBIA-SUICIDE SEVERITY RATING SCALE - C-SSRS
2. HAVE YOU ACTUALLY HAD ANY THOUGHTS OF KILLING YOURSELF?: NO
6. HAVE YOU EVER DONE ANYTHING, STARTED TO DO ANYTHING, OR PREPARED TO DO ANYTHING TO END YOUR LIFE?: NO
1. WITHIN THE PAST MONTH, HAVE YOU WISHED YOU WERE DEAD OR WISHED YOU COULD GO TO SLEEP AND NOT WAKE UP?: NO

## 2019-12-30 NOTE — PROGRESS NOTES
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   Social Needs    Financial resource strain: Not on file    Food insecurity:     Worry: Not on file     Inability: Not on file   [employer] needs:     Medical: Not on file     Non-medical: Not on file   Tobacco Use    Smoking status: Former Smoker    Smokeless tobacco: Never Used   Substance and Sexual Activity    Alcohol use: Not Currently    Drug use: Yes     Types: Marijuana     Comment: daily    Sexual activity: Yes     Partners: Female   Lifestyle    Physical activity:     Days per week: Not on file     Minutes per session: Not on file    Stress: Not on file   Relationships    Social connections:     Talks on phone: Not on file     Gets together: Not on file     Attends Muslim service: Not on file     Active member of club or organization: Not on file     Attends meetings of clubs or organizations: Not on file     Relationship status: Not on file    Intimate partner violence:     Fear of current or ex partner: Not on file     Emotionally abused: Not on file     Physically abused: Not on file     Forced sexual activity: Not on file   Other Topics Concern    Not on file   Social History Narrative    Siblings each with different father. Mother/Father with substance abuse. Feels his step father only positive male influence. Hx of drug abuse himself. Now smoking marijuana on daily basis. Unable to keep job due to failed drug screens. 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 Has goal to go to school to be [employer]. TOBACCO:   reports that he has quit smoking. He has never used smokeless tobacco.  ETOH:   reports previous alcohol use. A:  Administered PHQ-9 (see below). Patient endorses moderately severe symptoms of depression. Denied SI/HI.      PHQ Scores 12/30/2019 11/25/2019 10/30/2019 9/25/2019 8/21/2019 7/24/2019 6/26/2019   PHQ2 Score 3 2 5 4 3 2 4   PHQ9 Score 15 14 16 15 12 12 14     Interpretation of Total Score Depression Severity: 1-4 = Minimal depression, 5-9 = Mild depression, 10-14 = Moderate depression, 15-19 = Moderately severe depression, 20-27 = Severe depression        Diagnosis:  1. Moderate episode of recurrent major depressive disorder ([facility] [address]    2. DILIA (generalized anxiety disorder)    Cannabis Use Disorder      Plan:  Pt interventions:  Discussed and problem-solved barriers in adhering to behavioral change plan and Identified maladaptive thoughts        Documentation was done using voice recognition dragon software. Every effort was made to ensure accuracy; however, inadvertent, unintentional computerized transcription errors may be present.

## 2020-01-07 ENCOUNTER — APPOINTMENT (OUTPATIENT)
Dept: GENERAL RADIOLOGY | Age: 30
End: 2020-01-07
Payer: MEDICARE

## 2020-01-07 ENCOUNTER — HOSPITAL ENCOUNTER (EMERGENCY)
Age: 30
Discharge: HOME | End: 2020-01-07
Payer: MEDICARE

## 2020-01-07 VITALS
SYSTOLIC BLOOD PRESSURE: 153 MMHG | HEART RATE: 79 BPM | HEIGHT: 69 IN | WEIGHT: 279 LBS | OXYGEN SATURATION: 97 % | TEMPERATURE: 98.2 F | BODY MASS INDEX: 41.32 KG/M2 | DIASTOLIC BLOOD PRESSURE: 82 MMHG | RESPIRATION RATE: 16 BRPM

## 2020-01-07 PROCEDURE — 99284 EMERGENCY DEPT VISIT MOD MDM: CPT

## 2020-01-07 PROCEDURE — 71046 X-RAY EXAM CHEST 2 VIEWS: CPT

## 2020-01-07 PROCEDURE — 6370000000 HC RX 637 (ALT 250 FOR IP): Performed by: PHYSICIAN ASSISTANT

## 2020-01-07 RX ORDER — NAPROXEN 500 MG/1
500 TABLET ORAL 2 TIMES DAILY
Qty: 20 TABLET | Refills: 0 | Status: SHIPPED | OUTPATIENT
Start: 2020-01-07 | End: 2021-09-16

## 2020-01-07 RX ORDER — BENZONATATE 100 MG/1
100 CAPSULE ORAL 3 TIMES DAILY PRN
Qty: 30 CAPSULE | Refills: 0 | Status: SHIPPED | OUTPATIENT
Start: 2020-01-07 | End: 2020-01-14

## 2020-01-07 RX ORDER — NAPROXEN 250 MG/1
500 TABLET ORAL ONCE
Status: COMPLETED | OUTPATIENT
Start: 2020-01-07 | End: 2020-01-07

## 2020-01-07 RX ADMIN — NAPROXEN 500 MG: 250 TABLET ORAL at 11:15

## 2020-01-07 NOTE — ED PROVIDER NOTES
**EVALUATED BY ADVANCED PRACTICE PROVIDER**        [address] ENCOUNTER      Pt Name: Avni Cook  DJ:[id number]  [place] 1990  Date of evaluation: 1/7/2020  Provider: Radha Grant PA-C      Chief Complaint:    Chief Complaint   Patient presents with    Rib Pain     pt with c/o right sided rib pain ongoing after having cough/bronchitis symptoms- states still having cough and congestion symptoms- first began to hurt one week ago        Nursing Notes, Past Medical Hx, Past Surgical Hx, Social Hx, Allergies, and Family Hx were all reviewed and agreed with or any disagreements were addressed in the HPI.    HPI:  (Location, Duration, Timing, Severity, Quality, Assoc Sx, Context, Modifying factors)  This is a  30 y.o. male that presents to the emergency department with a chief complaint of some right lower anterior chest pain over his right lower ribs that began about 1 week ago. Patient states that he was having cough that was mostly productive before this began. Still having a cough but only productive of clear sputum now. States that the chest pain is worse when he takes a deep breath or coughs or sneeze. It is the worse whenever he coughs. Denies wheezing, history of asthma, abdominal pain, diarrhea, bloody stool, dysuria, hematuria, fevers, wheezing, nausea, vomiting, injury or trauma. Denies any history of PE or DVT, recent long trip or travel, recent surgery, leg swelling or hormone replacement. Rates the pain a 5 out of 10 just sitting there.       PastMedical/Surgical History:      Diagnosis Date    Acquired hypothyroidism 5/2/2019    DILIA (generalized anxiety disorder) 5/8/2019    Moderate episode of recurrent major depressive disorder ([address] 5/8/2019    Morbid obesity, unspecified obesity type ([address] 12/3/2019         Procedure Laterality Date    TONSILLECTOMY      as a child       Medications:  Discharge Medication List as of 1/7/2020 11:51 AM      CONTINUE these medications which have NOT CHANGED    Details   sertraline (ZOLOFT) 50 MG tablet TAKE 2 TABLETS BY MOUTH DAILY, Disp-60 tablet, R-5Normal      levothyroxine (SYNTHROID) 50 MCG tablet TAKE 1 TABLET BY MOUTH DAILY, Disp-30 tablet, R-0Normal               Review of Systems:  Review of Systems  Positives and Pertinent negatives as per HPI. Except as noted above in the ROS, problem specific ROS was completed and is negative. Physical Exam:  Physical Exam  Vitals signs and nursing note reviewed. Constitutional:       Appearance: He is well-developed. He is not diaphoretic. HENT:      Head: Atraumatic. Nose: Nose normal.   Eyes:      General:         Right eye: No discharge. Left eye: No discharge. Neck:      Musculoskeletal: Normal range of motion. Cardiovascular:      Rate and Rhythm: Normal rate and regular rhythm. Heart sounds: No murmur. No friction rub. No gallop. Pulmonary:      Effort: Pulmonary effort is normal. No respiratory distress. Breath sounds: No stridor. No wheezing, rhonchi or rales. Comments: Mild subjective tenderness to palpate over the right lower anterior chest without flail chest, tenderness, rash  Chest:      Chest wall: Tenderness present. Abdominal:      General: Bowel sounds are normal. There is no distension. Palpations: Abdomen is soft. There is no mass. Tenderness: There is no tenderness. There is no guarding or rebound. Hernia: No hernia is present. Musculoskeletal: Normal range of motion. General: No swelling. Skin:     General: Skin is warm and dry. Findings: No erythema or rash. Neurological:      Mental Status: He is alert and oriented to person, place, and time. Cranial Nerves: No cranial nerve deficit.    Psychiatric:         Behavior: Behavior normal.         MEDICAL DECISION MAKING    Vitals:    Vitals:    01/07/20 1052   BP: (!) 153/82   Pulse: 79   Resp: 16 were completed with a voice recognition program.  Efforts were made to edit the dictations but occasionally words are mis-transcribed.)    Electronically signed, Tashia Hurd PA-C,           Tashia Hurd PA-C  01/07/20 7780

## 2020-01-07 NOTE — ED NOTES
Bed: [facility]-A  Expected date:   Expected time:   Means of arrival: Walk In  Comments:     Pam Pizarro RN  01/07/20 0352

## 2020-01-15 ASSESSMENT — PAIN SCALES - GENERAL: PAINLEVEL_OUTOF10: 10

## 2020-01-16 ENCOUNTER — APPOINTMENT (OUTPATIENT)
Dept: GENERAL RADIOLOGY | Age: 30
End: 2020-01-16
Payer: MEDICARE

## 2020-01-16 ENCOUNTER — HOSPITAL ENCOUNTER (EMERGENCY)
Age: 30
Discharge: HOME | End: 2020-01-16
Attending: FAMILY MEDICINE
Payer: MEDICARE

## 2020-01-16 VITALS
RESPIRATION RATE: 16 BRPM | BODY MASS INDEX: 41.47 KG/M2 | HEIGHT: 69 IN | TEMPERATURE: 97.9 F | WEIGHT: 280 LBS | SYSTOLIC BLOOD PRESSURE: 148 MMHG | OXYGEN SATURATION: 95 % | DIASTOLIC BLOOD PRESSURE: 78 MMHG | HEART RATE: 76 BPM

## 2020-01-16 LAB
ANION GAP SERPL CALCULATED.3IONS-SCNC: 13 MMOL/L (ref 3–16)
BASOPHILS ABSOLUTE: 0.1 K/UL (ref 0–0.2)
BASOPHILS RELATIVE PERCENT: 0.6 %
BUN BLDV-MCNC: 23 MG/DL (ref 7–20)
CALCIUM SERPL-MCNC: 9.3 MG/DL (ref 8.3–10.6)
CHLORIDE BLD-SCNC: 99 MMOL/L (ref 99–110)
CO2: 24 MMOL/L (ref 21–32)
CREAT SERPL-MCNC: 0.9 MG/DL (ref 0.9–1.3)
D DIMER: <200 NG/ML DDU (ref 0–229)
EKG ATRIAL RATE: 70 BPM
EKG DIAGNOSIS: NORMAL
EKG P AXIS: 28 DEGREES
EKG P-R INTERVAL: 158 MS
EKG Q-T INTERVAL: 386 MS
EKG QRS DURATION: 104 MS
EKG QTC CALCULATION (BAZETT): 416 MS
EKG R AXIS: 63 DEGREES
EKG T AXIS: 62 DEGREES
EKG VENTRICULAR RATE: 70 BPM
EOSINOPHILS ABSOLUTE: 0.2 K/UL (ref 0–0.6)
EOSINOPHILS RELATIVE PERCENT: 2.6 %
GFR AFRICAN AMERICAN: >60
GFR NON-AFRICAN AMERICAN: >60
GLUCOSE BLD-MCNC: 100 MG/DL (ref 70–99)
HCT VFR BLD CALC: 42.7 % (ref 40.5–52.5)
HEMOGLOBIN: 14.5 G/DL (ref 13.5–17.5)
INR BLD: 1.07 (ref 0.86–1.14)
LIPASE: 27 U/L (ref 13–60)
LYMPHOCYTES ABSOLUTE: 3.3 K/UL (ref 1–5.1)
LYMPHOCYTES RELATIVE PERCENT: 35.2 %
MCH RBC QN AUTO: 29.1 PG (ref 26–34)
MCHC RBC AUTO-ENTMCNC: 33.9 G/DL (ref 31–36)
MCV RBC AUTO: 85.8 FL (ref 80–100)
MONOCYTES ABSOLUTE: 0.7 K/UL (ref 0–1.3)
MONOCYTES RELATIVE PERCENT: 7.7 %
NEUTROPHILS ABSOLUTE: 5.1 K/UL (ref 1.7–7.7)
NEUTROPHILS RELATIVE PERCENT: 53.9 %
PDW BLD-RTO: 13 % (ref 12.4–15.4)
PLATELET # BLD: 262 K/UL (ref 135–450)
PMV BLD AUTO: 8.8 FL (ref 5–10.5)
POTASSIUM SERPL-SCNC: 4.9 MMOL/L (ref 3.5–5.1)
PROTHROMBIN TIME: 12.4 SEC (ref 10–13.2)
RBC # BLD: 4.98 M/UL (ref 4.2–5.9)
SODIUM BLD-SCNC: 136 MMOL/L (ref 136–145)
TROPONIN: <0.01 NG/ML
WBC # BLD: 9.5 K/UL (ref 4–11)

## 2020-01-16 PROCEDURE — 71046 X-RAY EXAM CHEST 2 VIEWS: CPT

## 2020-01-16 PROCEDURE — 84484 ASSAY OF TROPONIN QUANT: CPT

## 2020-01-16 PROCEDURE — 85025 COMPLETE CBC W/AUTO DIFF WBC: CPT

## 2020-01-16 PROCEDURE — 80048 BASIC METABOLIC PNL TOTAL CA: CPT

## 2020-01-16 PROCEDURE — 83690 ASSAY OF LIPASE: CPT

## 2020-01-16 PROCEDURE — 85610 PROTHROMBIN TIME: CPT

## 2020-01-16 PROCEDURE — 93005 ELECTROCARDIOGRAM TRACING: CPT | Performed by: FAMILY MEDICINE

## 2020-01-16 PROCEDURE — 99284 EMERGENCY DEPT VISIT MOD MDM: CPT

## 2020-01-16 PROCEDURE — 85379 FIBRIN DEGRADATION QUANT: CPT

## 2020-01-16 PROCEDURE — 36415 COLL VENOUS BLD VENIPUNCTURE: CPT

## 2020-01-16 PROCEDURE — 93010 ELECTROCARDIOGRAM REPORT: CPT | Performed by: INTERNAL MEDICINE

## 2020-01-16 NOTE — ED PROVIDER NOTES
Triage Chief Complaint:   Rib Pain (patient was seen here 1/7 for rib pain, still getting worse per patient, when he coughs, moves and now feels like it is in his back, points to pain on R side ribs/RUQ)    [place]:  Kelly Eastman is a 30 y.o. male that presents with pleuritic right-sided chest pain that gets worse with deep inspiration and movement. No hemoptysis. No change with eating or drinking. Denies right upper quadrant or epigastric pain. Pain gets better with NSAIDs. Patient seen with a negative chest x-ray in the last 72 hours but became worried and is requesting repeat evaluation. He denies rash petechiae purpura or vesicles. He denies trauma. He denies dysuria frequency or flank pain.     ROS:  General:  No fevers, no chills, no weakness  Eyes:  No recent vison changes, no discharge  ENT:  No sore throat, no nasal congestion, no hearing changes  Cardiovascular: As above  Respiratory:  No shortness of breath, no cough, no wheezing  Gastrointestinal:  No pain, no nausea, no vomiting, no diarrhea  Musculoskeletal:  No muscle pain, no joint pain  Skin:  No rash, no pruritis, no easy bruising  Neurologic:  No speech problems, no headache, no extremity numbness, no extremity tingling, no extremity weakness  Psychiatric:  No anxiety, no hallucinations or delusions, no suicidal or homicidal ideation  Genitourinary:  No dysuria, no hematuria  Endocrine:  No unexpected weight gain, no unexpected weight loss  Extremities:  no edema, no pain    Past Medical History:   Diagnosis Date    Acquired hypothyroidism 5/2/2019    DILIA (generalized anxiety disorder) 5/8/2019    Moderate episode of recurrent major depressive disorder ([address] 5/8/2019    Morbid obesity, unspecified obesity type ([address] 12/3/2019     Past Surgical History:   Procedure Laterality Date    TONSILLECTOMY      as a child     Family History   Problem Relation Age of Onset    Substance Abuse Mother     Depression Mother     Kidney Disease Father visit (if applicable):  Results for orders placed or performed during the hospital encounter of 51/04/11   Basic Metabolic Panel   Result Value Ref Range    Sodium 136 136 - 145 mmol/L    Potassium 4.9 3.5 - 5.1 mmol/L    Chloride 99 99 - 110 mmol/L    CO2 24 21 - 32 mmol/L    Anion Gap 13 3 - 16    Glucose 100 (H) 70 - 99 mg/dL    BUN 23 (H) 7 - 20 mg/dL    CREATININE 0.9 0.9 - 1.3 mg/dL    GFR Non-African American >60 >60    GFR African American >60 >60    Calcium 9.3 8.3 - 10.6 mg/dL   Troponin   Result Value Ref Range    Troponin <0.01 <0.01 ng/mL   CBC Auto Differential   Result Value Ref Range    WBC 9.5 4.0 - 11.0 K/uL    RBC 4.98 4.20 - 5.90 M/uL    Hemoglobin 14.5 13.5 - 17.5 g/dL    Hematocrit 42.7 40.5 - 52.5 %    MCV 85.8 80.0 - 100.0 fL    MCH 29.1 26.0 - 34.0 pg    MCHC 33.9 31.0 - 36.0 g/dL    RDW 13.0 12.4 - 15.4 %    Platelets 151 326 - 789 K/uL    MPV 8.8 5.0 - 10.5 fL    Neutrophils % 53.9 %    Lymphocytes % 35.2 %    Monocytes % 7.7 %    Eosinophils % 2.6 %    Basophils % 0.6 %    Neutrophils Absolute 5.1 1.7 - 7.7 K/uL    Lymphocytes Absolute 3.3 1.0 - 5.1 K/uL    Monocytes Absolute 0.7 0.0 - 1.3 K/uL    Eosinophils Absolute 0.2 0.0 - 0.6 K/uL    Basophils Absolute 0.1 0.0 - 0.2 K/uL   Protime-INR   Result Value Ref Range    Protime 12.4 10.0 - 13.2 sec    INR 1.07 0.86 - 1.14   D-dimer, quantitative   Result Value Ref Range    D-Dimer, Quant <200 0 - 229 ng/mL DDU   Lipase   Result Value Ref Range    Lipase 27.0 13.0 - 60.0 U/L   EKG 12 Lead   Result Value Ref Range    Ventricular Rate 70 BPM    Atrial Rate 70 BPM    P-R Interval 158 ms    QRS Duration 104 ms    Q-T Interval 386 ms    QTc Calculation (Bazett) 416 ms    P Axis 28 degrees    R Axis 63 degrees    T Axis 62 degrees    Diagnosis Normal sinus rhythmNormal ECG       Radiographs (if obtained):  [] The following radiograph was interpreted by myself in the absence of a radiologist:   [] Radiologist's Report Reviewed:  XR CHEST STANDARD (2 VW)   Final Result   No acute disease. EKG (if obtained): (All EKG's are interpreted by myself in the absence of a cardiologist) EKG demonstrates normal sinus rhythm. Rate 70. Normal axis. . . QTc 416. Normal R wave progression. No acute ST elevation or depression. No Q waves. No flipped T wave. No Brugada. No peaked T waves. Normal EKG. Chart review shows recent radiographs:  Xr Chest Standard (2 Vw)    Result Date: 1/7/2020  EXAMINATION: TWO XRAY VIEWS OF THE CHEST 1/7/2020 11:19 am COMPARISON: None. HISTORY: ORDERING SYSTEM PROVIDED HISTORY: Chest Discomfort TECHNOLOGIST PROVIDED HISTORY: Reason for exam:->Chest Discomfort Reason for Exam: Rib Pain (pt with c/o right sided rib pain ongoing after having cough/bronchitis symptoms- states still having cough and congestion symptoms- first began to hurt one week ago ) Acuity: Acute Type of Exam: Initial FINDINGS: The cardiomediastinal contours are within normal limits. The lungs appear clear bilaterally. There is no evidence of focal consolidation, pulmonary edema, pleural effusion or pneumothorax. Negative chest x-ray. No evidence of acute cardiopulmonary disease. MDM:  61-year-old pleasant gentleman with history of underlying anxiety disorder presents with pleuritic type chest pain and normal vital signs. D-dimer negative and low risk patient do not believe that this is related to PE.  EKG and chest x-ray normal with a heart score of less than 3 I do not believe that this is ACS. Lipase and LFTs are normal and do not believe this is referred liver gallbladder or pancreatic pain. Chest x-ray with no pneumothorax, infiltrate, hemothorax, widened mediastinum, pulmonary congestion, or cardiomegaly    Patient to continue NSAIDs.     I estimate there is LOW risk for (including but not limited to) ACUTE CORONARY SYNDROME, PULMONARY EMBOLISM, PNEUMOTHORAX, RUPTURED ESOPHAGUS OR THORACIC AORTIC DISSECTION, thus I

## 2020-01-16 NOTE — ED NOTES
Reviewed discharge instructions, home meds, and follow up care with patient, verbalized understanding.       Tanvi Quiñones, RN  01/16/20 3561

## 2020-01-20 ENCOUNTER — OFFICE VISIT (OUTPATIENT)
Dept: INTERNAL MEDICINE CLINIC | Age: 30
End: 2020-01-20
Payer: COMMERCIAL

## 2020-01-20 VITALS
HEIGHT: 69 IN | DIASTOLIC BLOOD PRESSURE: 88 MMHG | HEART RATE: 66 BPM | BODY MASS INDEX: 43.1 KG/M2 | SYSTOLIC BLOOD PRESSURE: 132 MMHG | WEIGHT: 291 LBS

## 2020-01-20 DIAGNOSIS — R10.11 RUQ PAIN: ICD-10-CM

## 2020-01-20 LAB
ALBUMIN SERPL-MCNC: 4.7 G/DL (ref 3.4–5)
ALP BLD-CCNC: 47 U/L (ref 40–129)
ALT SERPL-CCNC: 27 U/L (ref 10–40)
AST SERPL-CCNC: 20 U/L (ref 15–37)
BILIRUB SERPL-MCNC: 0.3 MG/DL (ref 0–1)
BILIRUBIN DIRECT: <0.2 MG/DL (ref 0–0.3)
BILIRUBIN, INDIRECT: NORMAL MG/DL (ref 0–1)
TOTAL PROTEIN: 7.6 G/DL (ref 6.4–8.2)

## 2020-01-20 PROCEDURE — 99214 OFFICE O/P EST MOD 30 MIN: CPT | Performed by: INTERNAL MEDICINE

## 2020-01-20 RX ORDER — SERTRALINE HYDROCHLORIDE 100 MG/1
100 TABLET, FILM COATED ORAL DAILY
COMMUNITY
Start: 2019-12-29 | End: 2021-09-16

## 2020-01-20 SDOH — ECONOMIC STABILITY: TRANSPORTATION INSECURITY
IN THE PAST 12 MONTHS, HAS THE LACK OF TRANSPORTATION KEPT YOU FROM MEDICAL APPOINTMENTS OR FROM GETTING MEDICATIONS?: NO

## 2020-01-20 SDOH — ECONOMIC STABILITY: FOOD INSECURITY: WITHIN THE PAST 12 MONTHS, THE FOOD YOU BOUGHT JUST DIDN'T LAST AND YOU DIDN'T HAVE MONEY TO GET MORE.: NEVER TRUE

## 2020-01-20 SDOH — ECONOMIC STABILITY: INCOME INSECURITY: HOW HARD IS IT FOR YOU TO PAY FOR THE VERY BASICS LIKE FOOD, HOUSING, MEDICAL CARE, AND HEATING?: NOT HARD AT ALL

## 2020-01-20 SDOH — ECONOMIC STABILITY: TRANSPORTATION INSECURITY
IN THE PAST 12 MONTHS, HAS LACK OF TRANSPORTATION KEPT YOU FROM MEETINGS, WORK, OR FROM GETTING THINGS NEEDED FOR DAILY LIVING?: NO

## 2020-01-20 SDOH — ECONOMIC STABILITY: FOOD INSECURITY: WITHIN THE PAST 12 MONTHS, YOU WORRIED THAT YOUR FOOD WOULD RUN OUT BEFORE YOU GOT MONEY TO BUY MORE.: NEVER TRUE

## 2020-01-20 ASSESSMENT — ENCOUNTER SYMPTOMS
ABDOMINAL PAIN: 0
VOMITING: 0
TROUBLE SWALLOWING: 0
NAUSEA: 0
VOICE CHANGE: 0
WHEEZING: 0
SHORTNESS OF BREATH: 0

## 2020-01-20 NOTE — PROGRESS NOTES
insecurity:     Worry: Never true     Inability: Never true    Transportation needs:     Medical: No     Non-medical: No   Tobacco Use    Smoking status: Current Every Day Smoker     Types: E-Cigarettes    Smokeless tobacco: Never Used    Tobacco comment: vaping and marijuana   Substance and Sexual Activity    Alcohol use: Not Currently    Drug use: Yes     Types: Marijuana     Comment: daily    Sexual activity: Yes     Partners: Female   Lifestyle    Physical activity:     Days per week: None     Minutes per session: None    Stress: None   Relationships    Social connections:     Talks on phone: None     Gets together: None     Attends Nondenominational service: None     Active member of club or organization: None     Attends meetings of clubs or organizations: None     Relationship status: None    Intimate partner violence:     Fear of current or ex partner: None     Emotionally abused: None     Physically abused: None     Forced sexual activity: None   Other Topics Concern    None   Social History Narrative    Siblings each with different father. Mother/Father with substance abuse. Feels his step father only positive male influence. Hx of drug abuse himself. Now smoking marijuana on daily basis. Unable to keep job due to failed drug screens. 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 Has goal to go to school to be [employer]. Family History   Problem Relation Age of Onset    Substance Abuse Mother     Depression Mother     Kidney Disease Father     Heart Disease Father     COPD Father     High Blood Pressure Father     Stroke Father     Cancer Father         bladder    Alcohol Abuse Father        Review of Systems   Constitutional: Negative for diaphoresis and unexpected weight change. HENT: Negative for trouble swallowing and voice change. Respiratory: Negative for shortness of breath and wheezing. Cardiovascular: Negative for chest pain and palpitations.    Gastrointestinal: Negative for abdominal pain, nausea and vomiting. Neurological: Negative for dizziness and numbness. Hematological: Negative for adenopathy. Does not bruise/bleed easily. OBJECTIVE:  Pulse Readings from Last 4 Encounters:   01/20/20 66   01/16/20 76   01/07/20 79   12/03/19 80     Wt Readings from Last 4 Encounters:   01/20/20 291 lb (132 kg)   01/15/20 280 lb (127 kg)   01/07/20 279 lb (126.6 kg)   12/03/19 281 lb (127.5 kg)     BP Readings from Last 4 Encounters:   01/20/20 132/88   01/16/20 (!) 148/78   01/07/20 (!) 153/82   12/03/19 120/78     Physical Exam  Vitals signs and nursing note reviewed. Constitutional:       Appearance: He is obese. Eyes:      General: No scleral icterus. Conjunctiva/sclera: Conjunctivae normal.   Cardiovascular:      Rate and Rhythm: Normal rate and regular rhythm. Pulses: Normal pulses. Heart sounds: Normal heart sounds. Pulmonary:      Effort: Pulmonary effort is normal.      Breath sounds: Normal breath sounds. Abdominal:      General: Abdomen is flat. Bowel sounds are normal. There is no distension. Palpations: Abdomen is soft. Tenderness: There is no guarding or rebound. Musculoskeletal:      Right lower leg: No edema. Left lower leg: No edema. Neurological:      Mental Status: He is alert.          CBC:   Lab Results   Component Value Date    WBC 9.5 01/16/2020    HGB 14.5 01/16/2020    HCT 42.7 01/16/2020     01/16/2020     CMP:  Lab Results   Component Value Date     01/16/2020    K 4.9 01/16/2020    CL 99 01/16/2020    CO2 24 01/16/2020    ANIONGAP 13 01/16/2020    GLUCOSE 100 01/16/2020    BUN 23 01/16/2020    CREATININE 0.9 01/16/2020    GFRAA >60 01/16/2020    CALCIUM 9.3 01/16/2020    PROT 7.4 05/01/2019    LABALBU 4.5 05/01/2019    AGRATIO 1.6 05/01/2019    BILITOT 0.5 05/01/2019    ALKPHOS 55 05/01/2019    ALT 25 05/01/2019    AST 20 05/01/2019    GLOB 2.9 05/01/2019     URINALYSIS:  Lab Results   Component Value to the patient. Documentation was done using voice recognition dragon software. Every effort was made to ensure accuracy; however, inadvertent  Unintentional computerized transcription errors may be present.

## 2020-01-27 ENCOUNTER — OFFICE VISIT (OUTPATIENT)
Dept: PSYCHOLOGY | Age: 30
End: 2020-01-27
Payer: COMMERCIAL

## 2020-01-27 PROCEDURE — 90832 PSYTX W PT 30 MINUTES: CPT | Performed by: PSYCHOLOGIST

## 2020-01-27 ASSESSMENT — PATIENT HEALTH QUESTIONNAIRE - PHQ9
5. POOR APPETITE OR OVEREATING: 3
4. FEELING TIRED OR HAVING LITTLE ENERGY: 3
10. IF YOU CHECKED OFF ANY PROBLEMS, HOW DIFFICULT HAVE THESE PROBLEMS MADE IT FOR YOU TO DO YOUR WORK, TAKE CARE OF THINGS AT HOME, OR GET ALONG WITH OTHER PEOPLE: 1
SUM OF ALL RESPONSES TO PHQ QUESTIONS 1-9: 17
SUM OF ALL RESPONSES TO PHQ9 QUESTIONS 1 & 2: 3
8. MOVING OR SPEAKING SO SLOWLY THAT OTHER PEOPLE COULD HAVE NOTICED. OR THE OPPOSITE, BEING SO FIGETY OR RESTLESS THAT YOU HAVE BEEN MOVING AROUND A LOT MORE THAN USUAL: 2
9. THOUGHTS THAT YOU WOULD BE BETTER OFF DEAD, OR OF HURTING YOURSELF: 0
2. FEELING DOWN, DEPRESSED OR HOPELESS: 1
3. TROUBLE FALLING OR STAYING ASLEEP: 3
1. LITTLE INTEREST OR PLEASURE IN DOING THINGS: 2
6. FEELING BAD ABOUT YOURSELF - OR THAT YOU ARE A FAILURE OR HAVE LET YOURSELF OR YOUR FAMILY DOWN: 1
SUM OF ALL RESPONSES TO PHQ QUESTIONS 1-9: 17
7. TROUBLE CONCENTRATING ON THINGS, SUCH AS READING THE NEWSPAPER OR WATCHING TELEVISION: 2

## 2020-01-27 NOTE — PROGRESS NOTES
Behavioral Health Consultation  Shilpa Isbell, Ph.D.  Psychologist  1/27/2020  3:39 PM      Time spent with Patient: 28 minutes  This is patient's 11th  [facility] appointment. Reason for Consult:    Chief Complaint   Patient presents with    Depression       Feedback given to PCP. S:  Pt seen for f/u of depression and anxiety. Pt reported stable mood and sxs. Has made no progress toward finding apartment \"I'll do it at the last minute. It's fine. \" Not amenable to feedback about potential negative impact of this plan. 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 No longer feeling like it is necessary to know exactly what he wants to do in order to move forward w education.       O:  MSE:    Appearance    alert, cooperative  Appetite abnormal: high  Sleep disturbance Yes  Fatigue Yes  Loss of pleasure Yes  Impulsive behavior Yes  Speech    spontaneous, normal rate, normal volume, interrupting and high productivity  Mood    Depressed  Affect    normal affect  Thought Content    cognitive distortions and all or nothing thinking  Thought Process    goal directed, coherent and tangential  Associations    logical connections, tangential connections  Insight    Fair  Judgment    Fair  Orientation    oriented to person, place, time, and general circumstances  Memory    recent and remote memory intact  Attention/Concentration    impaired  Morbid ideation No  Suicide Assessment    no suicidal ideation    History:  Social History:   Social History     Socioeconomic History    Marital status: Single     Spouse name: Not on file    Number of children: Not on file   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 Moderate episode of recurrent major depressive disorder ([facility] [address]    2. DILIA (generalized anxiety disorder)          Plan:  Pt interventions:  Trained in strategies for increasing balanced thinking and Discussed and problem-solved barriers in adhering to behavioral change plan, collaboratively set goals for interim. Documentation was done using voice recognition dragon software. Every effort was made to ensure accuracy; however, inadvertent, unintentional computerized transcription errors may be present.

## 2020-02-27 ENCOUNTER — OFFICE VISIT (OUTPATIENT)
Dept: PSYCHOLOGY | Age: 30
End: 2020-02-27
Payer: COMMERCIAL

## 2020-02-27 PROCEDURE — 90832 PSYTX W PT 30 MINUTES: CPT | Performed by: PSYCHOLOGIST

## 2020-02-27 ASSESSMENT — PATIENT HEALTH QUESTIONNAIRE - PHQ9
8. MOVING OR SPEAKING SO SLOWLY THAT OTHER PEOPLE COULD HAVE NOTICED. OR THE OPPOSITE, BEING SO FIGETY OR RESTLESS THAT YOU HAVE BEEN MOVING AROUND A LOT MORE THAN USUAL: 1
SUM OF ALL RESPONSES TO PHQ QUESTIONS 1-9: 8
3. TROUBLE FALLING OR STAYING ASLEEP: 1
4. FEELING TIRED OR HAVING LITTLE ENERGY: 1
7. TROUBLE CONCENTRATING ON THINGS, SUCH AS READING THE NEWSPAPER OR WATCHING TELEVISION: 0
9. THOUGHTS THAT YOU WOULD BE BETTER OFF DEAD, OR OF HURTING YOURSELF: 0
2. FEELING DOWN, DEPRESSED OR HOPELESS: 1
SUM OF ALL RESPONSES TO PHQ QUESTIONS 1-9: 8
5. POOR APPETITE OR OVEREATING: 1
6. FEELING BAD ABOUT YOURSELF - OR THAT YOU ARE A FAILURE OR HAVE LET YOURSELF OR YOUR FAMILY DOWN: 0
SUM OF ALL RESPONSES TO PHQ9 QUESTIONS 1 & 2: 4
1. LITTLE INTEREST OR PLEASURE IN DOING THINGS: 3
10. IF YOU CHECKED OFF ANY PROBLEMS, HOW DIFFICULT HAVE THESE PROBLEMS MADE IT FOR YOU TO DO YOUR WORK, TAKE CARE OF THINGS AT HOME, OR GET ALONG WITH OTHER PEOPLE: 1

## 2020-03-25 ENCOUNTER — TELEPHONE (OUTPATIENT)
Dept: PSYCHOLOGY | Age: 30
End: 2020-03-25

## 2020-03-26 ENCOUNTER — TELEPHONE (OUTPATIENT)
Dept: PSYCHOLOGY | Age: 30
End: 2020-03-26

## 2020-03-26 NOTE — TELEPHONE ENCOUNTER
Pt declined phone visit and virtual visit today. Stated he is doing fine and would rather schedule for 6 wks when this is hopefully all over.

## 2020-03-31 ENCOUNTER — TELEPHONE (OUTPATIENT)
Dept: PULMONOLOGY | Age: 30
End: 2020-03-31

## 2020-04-21 RX ORDER — LEVOTHYROXINE SODIUM 0.05 MG/1
50 TABLET ORAL DAILY
Qty: 30 TABLET | Refills: 0 | Status: SHIPPED | OUTPATIENT
Start: 2020-04-21 | End: 2020-05-22

## 2021-04-13 ENCOUNTER — VIRTUAL VISIT (OUTPATIENT)
Dept: PULMONOLOGY | Age: 31
End: 2021-04-13
Payer: COMMERCIAL

## 2021-04-13 DIAGNOSIS — E66.01 MORBID OBESITY, UNSPECIFIED OBESITY TYPE (HCC): Chronic | ICD-10-CM

## 2021-04-13 DIAGNOSIS — G47.33 OBSTRUCTIVE SLEEP APNEA SYNDROME: Primary | ICD-10-CM

## 2021-04-13 PROCEDURE — 99214 OFFICE O/P EST MOD 30 MIN: CPT | Performed by: NURSE PRACTITIONER

## 2021-04-13 ASSESSMENT — SLEEP AND FATIGUE QUESTIONNAIRES
HOW LIKELY ARE YOU TO NOD OFF OR FALL ASLEEP WHILE SITTING AND READING: 0
HOW LIKELY ARE YOU TO NOD OFF OR FALL ASLEEP WHILE SITTING INACTIVE IN A PUBLIC PLACE: 0
HOW LIKELY ARE YOU TO NOD OFF OR FALL ASLEEP WHILE LYING DOWN TO REST IN THE AFTERNOON WHEN CIRCUMSTANCES PERMIT: 0
HOW LIKELY ARE YOU TO NOD OFF OR FALL ASLEEP WHEN YOU ARE A PASSENGER IN A CAR FOR AN HOUR WITHOUT A BREAK: 0
HOW LIKELY ARE YOU TO NOD OFF OR FALL ASLEEP WHILE SITTING AND TALKING TO SOMEONE: 0

## 2021-04-13 NOTE — PROGRESS NOTES
Poli Band         : 1990    Diagnosis: [x] ARIEL (G47.33) [] CSA (G47.31) [] Apnea (G47.30)   Length of Need: [x] 12 Months [] 99 Months [] Other:    Machine (MEIR!): [x] Respironics Dream Station   2   Auto [] ResMed AirSense     Auto [] Other:     []  CPAP () [] Bilevel ()   Mode: [] Auto [] Spontaneous    Mode: [] Auto [] Spontaneous                            Comfort Settings:   - Ramp Pressure:  cmH2O                                        - Ramp time: 15 min                                     -  Flex/EPR - 3 full time                                    - For ResMed Bilevel (TiMax-4 sec   TiMin- 0.2 sec)     Humidifier: [x] Heated ()        [x] Water chamber replacement ()/ 1 per 6 months        Mask:   [] Nasal () /1 per 3 months [x] Full Face () /1 per 3 months   [] Patient choice -Size and fit mask [x] Patient Choice - Size and fit mask   [] Dispense:  [] Dispense:    [] Headgear () / 1 per 3 months [x] Headgear () / 1 per 3 months   [] Replacement Nasal Cushion ()/2 per month [x] Interface Replacement ()/1 per month   [] Replacement Nasal Pillows ()/2 per month         Tubing: [x] Heated ()/1 per 3 months    [] Standard ()/1 per 3 months [] Other:           Filters: [x] Non-disposable ()/1 per 6 months     [x] Ultra-Fine, Disposable ()/2 per month        Miscellaneous: [] Chin Strap ()/ 1 per 6 months [] O2 bleed-in:       LPM   [] Oximetry on CPAP/Bilevel []  Other:    [x] Modem: ()         Start Order Date: 21    MEDICAL JUSTIFICATION:  I, the undersigned, certify that the above prescribed supplies are medically necessary for this patients wellbeing. In my opinion, the supplies are both reasonable and necessary in reference to accepted standards of medicalpractice in treatment of this patients condition.     CELESTE Hannon - ANATOLY      NPI: [id number]       Order Signed Date: 21    Electronically signed by CELESTE Escalona CNP on 2021 at 11:09 AM    Genaro Repress  1990  [address]  [phone] (home)   [phone] (mobile)      Insurance Info (confirm with patient if correct):  Payor/Plan Subscr  Sex Relation Sub.  Ins. ID Effective Group Num

## 2021-04-13 NOTE — PROGRESS NOTES
21    Electronically signed by CELESTE Chavez CNP on 2021 at 11:09 AM    Leah Medici  1990  [address]  [phone] (home)   [phone] (mobile)      Insurance Info (confirm with patient if correct):  Payor/Plan Subscr  Sex Relation Sub.  Ins. ID Effective Group Num

## 2021-04-13 NOTE — PROGRESS NOTES
Jose White MD, FAASM, [facility], MSN, RN, [address] [facility] [address] [address]  Dept: [phone]  Dept Fax: [phone]  Loc: [phone]    Subjective:     Patient ID: Genaro Mooney is a 35 y.o. male. Chief Complaint   Patient presents with    Sleep Apnea       HPI:      Sleep Medicine Video Visit    Pursuant to the emergency declaration under the [address] [place] [address] waiver authority and the [employer] Act this Telephone Visit was insisted, with patient's consent, to reduce the patient's risk of exposure to COVID-19 and provide continuity of care for an established patient. Services were provided through a synchronous discussion over a telephone and/or Video chat to substitute for in-person clinic visit, and coded as such. While patient is at home. [place] - Total score: 0    Follow-up :     Last Visit : December 2019    Had study formal in lab split study done on 12/4/2019 which showed an AHI - 61.9/hr with Low SaO2 - 82% and time below 90% of 19.6min. This is consistent with severe RAIEL (327.23)      Subjective Health Changes: None      Nocturia   0-1  per night. Having  HA upon waking  [x] Yes  [] No   Dry mouth upon waking   Dry Nose  Dry Eyes  [x] Yes  [] No   Congestion upon waking   [] Yes  [x] No    Nose Bleeds  [] Yes  [x] No   Using Sleep Aides   [x] NA   Difficulties falling asleep  [] Yes  [x] No   Difficulties staying asleep  [x] Yes  [] No   Approximate time to bed  12am   Approximate wake time  8am   Taking Naps  no   If taking naps usual length   [x] NA   Drowsy when driving  [] Yes  [x] No     Does patient carry a DOT/CDL  [] Yes  [x] No     Does patient carry FAA/Pil[employer] License   [] Yes  [x] No       Assessment/Plan:     1.  Obstructive sleep apnea syndrome  Assessment & Plan:  New never treated Moderate risk of heat attack, stroke, car accidents, or death with out treatment   2. Morbid obesity, unspecified obesity type [facility])  Assessment & Plan:  Patient encouraged to work on maintaining a healthy weight per height. Achievable with diet restriction/modifications and exercise (may consult primary care if unsure of any restrictions or concerns). Weight management directly correlates to risk of control and maintenance of Obstructive Sleep Apnea. The chronic medical conditions listed are directly related to the primary diagnosis listed above. The management of the primary diagnosis affects the secondary diagnosis and vice versa. - Continue medications per his PCP and other physicians.   - he instructed not to drive unless had 4 hrs of effective therapy for his ARIEL the night before. - Did review the risks of under or untreated ARIEL including, but not limited to, higher risks of motor vehicle accidents, stroke, heart attacks, and death. - he understands and accepts all these risks.     - The patient is advised to use the machine every night.   - Patient using [facility]  for supplies  - Educated on How to obtain the machine  Office locations  when to call the office  - Will follow up in 3 months    Electronically signed by Mark Singleton, MOE, RN, CNP on 4/13/2021 at 11:07 AM

## 2021-04-13 NOTE — PROGRESS NOTES
Genaro Mooney         : 1990    Diagnosis: [x] ARIEL (G47.33) [] CSA (G47.31) [] Apnea (G47.30)   Length of Need: [x] 12 Months [] 99 Months [] Other:    Machine (MEIR!): [x] Respironics Dream Station   2   Auto [] ResMed AirSense     Auto [] Other:     [x]  CPAP () [] Bilevel ()   Mode: [x] Auto [] Spontaneous    Mode: [] Auto [] Spontaneous        APAP - Settings  Pressure Min: 9 cmH2O  Pressure Max: 20 cmH2O                   Comfort Settings:   - Ramp Pressure: 5 cmH2O                                        - Ramp time: 15 min                                     -  Flex/EPR - 3 full time                                    - For ResMed Bilevel (TiMax-4 sec   TiMin- 0.2 sec)     Humidifier: [x] Heated ()        [x] Water chamber replacement ()/ 1 per 6 months        Mask:   [] Nasal () /1 per 3 months [] Full Face () /1 per 3 months   [] Patient choice -Size and fit mask [] Patient Choice - Size and fit mask   [] Dispense:  [] Dispense:    [] Headgear () / 1 per 3 months [] Headgear () / 1 per 3 months   [] Replacement Nasal Cushion ()/2 per month [] Interface Replacement ()/1 per month   [] Replacement Nasal Pillows ()/2 per month         Tubing: [x] Heated ()/1 per 3 months    [] Standard ()/1 per 3 months [] Other:           Filters: [x] Non-disposable ()/1 per 6 months     [x] Ultra-Fine, Disposable ()/2 per month        Miscellaneous: [] Chin Strap ()/ 1 per 6 months [] O2 bleed-in:       LPM   [] Oximetry on CPAP/Bilevel []  Other:    [x] Modem: ()         Start Order Date: 21    MEDICAL JUSTIFICATION:  I, the undersigned, certify that the above prescribed supplies are medically necessary for this patients wellbeing. In my opinion, the supplies are both reasonable and necessary in reference to accepted standards of medicalpractice in treatment of this patients condition.     Jeanine Abad, APRN - CNP

## 2021-07-23 ENCOUNTER — VIRTUAL VISIT (OUTPATIENT)
Dept: PULMONOLOGY | Age: 31
End: 2021-07-23
Payer: COMMERCIAL

## 2021-07-23 DIAGNOSIS — E66.01 MORBID OBESITY, UNSPECIFIED OBESITY TYPE (HCC): Chronic | ICD-10-CM

## 2021-07-23 DIAGNOSIS — G47.33 OBSTRUCTIVE SLEEP APNEA SYNDROME: Primary | ICD-10-CM

## 2021-07-23 PROCEDURE — 99214 OFFICE O/P EST MOD 30 MIN: CPT | Performed by: NURSE PRACTITIONER

## 2021-07-23 ASSESSMENT — SLEEP AND FATIGUE QUESTIONNAIRES
HOW LIKELY ARE YOU TO NOD OFF OR FALL ASLEEP WHILE SITTING QUIETLY AFTER LUNCH WITHOUT ALCOHOL: 1
HOW LIKELY ARE YOU TO NOD OFF OR FALL ASLEEP WHILE LYING DOWN TO REST IN THE AFTERNOON WHEN CIRCUMSTANCES PERMIT: 3
HOW LIKELY ARE YOU TO NOD OFF OR FALL ASLEEP WHEN YOU ARE A PASSENGER IN A CAR FOR AN HOUR WITHOUT A BREAK: 2
HOW LIKELY ARE YOU TO NOD OFF OR FALL ASLEEP WHILE SITTING AND TALKING TO SOMEONE: 0
HOW LIKELY ARE YOU TO NOD OFF OR FALL ASLEEP WHILE WATCHING TV: 0
HOW LIKELY ARE YOU TO NOD OFF OR FALL ASLEEP WHILE SITTING AND READING: 1
ESS TOTAL SCORE: 7
HOW LIKELY ARE YOU TO NOD OFF OR FALL ASLEEP WHILE SITTING INACTIVE IN A PUBLIC PLACE: 0
HOW LIKELY ARE YOU TO NOD OFF OR FALL ASLEEP IN A CAR, WHILE STOPPED FOR A FEW MINUTES IN TRAFFIC: 0

## 2021-07-23 NOTE — PROGRESS NOTES
Thai Burgos MD, FAA, [facility]  Jazmín Higgins, MSN, RN, [address] [address]  Dept: [phone]  Dept Fax: [phone]  Loc: [phone]    Subjective:     Patient ID: Ceci Abraham is a 35 y.o. male. Chief Complaint   Patient presents with    Sleep Apnea       HPI:      Sleep Medicine Video Visit    Pursuant to the emergency declaration under the [address] [place] [address] waiver authority and the [employer] Act this Telephone Visit was insisted, with patient's consent, to reduce the patient's risk of exposure to COVID-19 and provide continuity of care for an established patient. Services were provided through a synchronous discussion over a telephone and/or Video chat to substitute for in-person clinic visit, and coded as such. While patient is at home.     Machine Modem/Download Info:  Compliance (hours/night): 1.6 hrs/night  Download AHI (/hour): 3.1 /HR  Average CPAP Pressure : 12.1 cmH2O           APAP - Settings  Pressure Min: 9 cmH2O  Pressure Max: 20 cmH2O                 Comfort Settings  Humidity Level (0-8): 0  Flex/EPR (0-3): 3 PAP Mask  Mask Type: Full Face mask  Clinically Relevant Leak: Yes     [place] - Total score: 7    Follow-up :     Last Visit : April 2021      Subjective Health Changes: none      Over Night Oximetry: [] Yes  [] No  [x] NA [] WNL   Using O2: [] Yes  [] No  [x] NA   Patient is compliant with the machine  [] Yes  [x] No [] Per patient   Feeling rested when using the machine   [] Yes  [x] No     Pressure is comfortable with inspiration and expiration  [] Yes  [x] No   ([] NA   [] Feeling of suffocation  [] Feeling like not enough air    [x] To much pressure)     Noticed changes in pressure  [x] NA  [] Yes    [] No     Mask is fitting well  [x] Yes  [] No   Noting Mask Air Leak  [x] Yes  [] No Having painful Aerophagia  [] Yes  [x] No   Nocturia   0-1  per night. Having  HA upon waking  [] Yes  [x] No   Dry mouth upon waking   Dry Nose  Dry Eyes  [x] Yes  [] No   Congestion upon waking   [] Yes  [x] No    Nose Bleeds  [] Yes  [x] No   Using Sleep Aides  [x] NA  [] OTC  [] Per our office   [] Per another provider   Understands how to change humidification and/or tubing temperature for comfort while at home  [x] Yes  [] No     Difficulties falling asleep  [] Yes  [x] No   Difficulties staying asleep  [] Yes  [x] No   Approximate time to bed  12-1am   Approximate wake time  8-10am   Taking Naps  frequent   If taking naps usual length  1+ hours     [] NA   If taking naps using the machine [] NA  [] Yes    [x] No    [] With and With out    Drowsy when driving  [] Yes  [x] No     Does patient carry a DOT/CDL  [] Yes  [x] No     Does patient carry FAA/[employer] License   [] Yes  [x] No      Any concerns noted with the machine at this time  [] Yes  [x] No       Assessment/Plan:     1. Obstructive sleep apnea syndrome  Assessment & Plan:  After downloading data and reviewing  Reviewed compliance download with pt. Supplies and parts as needed for his machine. These are medically necessary. Continue medications per his PCP and other physicians. Limit caffeine use after 3pm.    The chronic medical conditions listed are directly related to the primary diagnosis listed above. The management of the primary diagnosis affects the secondary diagnosis and vice versa    Patient is NOT compliant at this time. Increasing the risk of heart attacks, strokes, car accidents, and/or death from uncontrolled Obstructive Sleep Apnea      2. Morbid obesity, unspecified obesity type [facility])  Assessment & Plan:  Patient encouraged to work on maintaining a healthy weight per height. Achievable with diet restriction/modifications and exercise (may consult primary care if unsure of any restrictions or concerns).      Weight management directly correlates to risk of control and maintenance of Obstructive Sleep Apnea. The chronic medical conditions listed are directly related to the primary diagnosis listed above. The management of the primary diagnosis affects the secondary diagnosis and vice versa. - After pulling data and reviewing it   - Educated patient and reviewed down load from modem with the patient   - Continue medications per his PCP and other physicians.   - he instructed not to drive unless had 4 hrs of effective therapy for his ARIEL the night before. - Did review the risks of under or untreated ARIEL including, but not limited to, higher risks of motor vehicle accidents, stroke, heart attacks, and death. - he understands and accepts all these risks. - The patient is advised to use the machine every night.   - Patient using  [facility] for supplies  - Patient educated on   Tube Temperature  Humidifier  (if you are dry turn it high)  Napping with the machine  Contacting the DME for ordering issues/Mask refit  Office locations  Compliance  Follow up  The risk of undercontrolling Obstructive sleep apnea  After speaking to the patient, patient is currently stable.  We will continue with the current machine settings  Recall Information and DME contact    -Will follow up in office in 3 months      Electronically signed by CELESTE Thurman CNP on 7/23/2021 at 11:54 AM

## 2021-07-23 NOTE — ASSESSMENT & PLAN NOTE
After downloading data and reviewing  Reviewed compliance download with pt. Supplies and parts as needed for his machine. These are medically necessary. Continue medications per his PCP and other physicians. Limit caffeine use after 3pm.    The chronic medical conditions listed are directly related to the primary diagnosis listed above. The management of the primary diagnosis affects the secondary diagnosis and vice versa    Patient is NOT compliant at this time.       Increasing the risk of heart attacks, strokes, car accidents, and/or death from uncontrolled Obstructive Sleep Apnea

## 2021-09-16 ENCOUNTER — OFFICE VISIT (OUTPATIENT)
Dept: INTERNAL MEDICINE CLINIC | Age: 31
End: 2021-09-16
Payer: COMMERCIAL

## 2021-09-16 VITALS
HEART RATE: 95 BPM | WEIGHT: 300 LBS | HEIGHT: 69 IN | TEMPERATURE: 97.5 F | SYSTOLIC BLOOD PRESSURE: 132 MMHG | BODY MASS INDEX: 44.43 KG/M2 | OXYGEN SATURATION: 98 % | DIASTOLIC BLOOD PRESSURE: 80 MMHG

## 2021-09-16 DIAGNOSIS — E03.9 ACQUIRED HYPOTHYROIDISM: ICD-10-CM

## 2021-09-16 DIAGNOSIS — Z30.09 VASECTOMY EVALUATION: ICD-10-CM

## 2021-09-16 DIAGNOSIS — F33.1 MODERATE EPISODE OF RECURRENT MAJOR DEPRESSIVE DISORDER (HCC): ICD-10-CM

## 2021-09-16 DIAGNOSIS — Z00.00 ROUTINE PHYSICAL EXAMINATION: Primary | ICD-10-CM

## 2021-09-16 DIAGNOSIS — Z20.822 CLOSE EXPOSURE TO COVID-19 VIRUS: ICD-10-CM

## 2021-09-16 DIAGNOSIS — G47.33 OSA (OBSTRUCTIVE SLEEP APNEA): ICD-10-CM

## 2021-09-16 DIAGNOSIS — R41.840 ATTENTION DEFICIT: ICD-10-CM

## 2021-09-16 DIAGNOSIS — E66.01 MORBID OBESITY, UNSPECIFIED OBESITY TYPE (HCC): ICD-10-CM

## 2021-09-16 PROBLEM — F12.20 CANNABIS USE DISORDER, MODERATE, DEPENDENCE (HCC): Status: RESOLVED | Noted: 2019-05-08 | Resolved: 2021-09-16

## 2021-09-16 PROCEDURE — 99395 PREV VISIT EST AGE 18-39: CPT | Performed by: INTERNAL MEDICINE

## 2021-09-16 RX ORDER — LEVOTHYROXINE SODIUM 0.05 MG/1
50 TABLET ORAL DAILY
Qty: 90 TABLET | Refills: 3 | Status: SHIPPED | OUTPATIENT
Start: 2021-09-16 | End: 2021-11-03

## 2021-09-16 RX ORDER — BUPROPION HYDROCHLORIDE 150 MG/1
150 TABLET, EXTENDED RELEASE ORAL 2 TIMES DAILY
Qty: 180 TABLET | Refills: 1 | Status: SHIPPED | OUTPATIENT
Start: 2021-09-16 | End: 2022-01-07 | Stop reason: DRUGHIGH

## 2021-09-16 SDOH — ECONOMIC STABILITY: FOOD INSECURITY: WITHIN THE PAST 12 MONTHS, THE FOOD YOU BOUGHT JUST DIDN'T LAST AND YOU DIDN'T HAVE MONEY TO GET MORE.: NEVER TRUE

## 2021-09-16 SDOH — ECONOMIC STABILITY: FOOD INSECURITY: WITHIN THE PAST 12 MONTHS, YOU WORRIED THAT YOUR FOOD WOULD RUN OUT BEFORE YOU GOT MONEY TO BUY MORE.: NEVER TRUE

## 2021-09-16 ASSESSMENT — SOCIAL DETERMINANTS OF HEALTH (SDOH): HOW HARD IS IT FOR YOU TO PAY FOR THE VERY BASICS LIKE FOOD, HOUSING, MEDICAL CARE, AND HEATING?: NOT HARD AT ALL

## 2021-09-16 ASSESSMENT — ENCOUNTER SYMPTOMS
ABDOMINAL PAIN: 0
SHORTNESS OF BREATH: 0
VOMITING: 0
NAUSEA: 0
VOICE CHANGE: 0
WHEEZING: 0
TROUBLE SWALLOWING: 0

## 2021-09-16 NOTE — PROGRESS NOTES
Mary Dominguez  1990  male  28 y.o. SUBJECTIVE:       Chief Complaint   Patient presents with    Follow-up     concerns of ADHD medicine, thyroid concerns       HPI:  Patient has not been evaluated in office or virtual quite some times. History of hypothyroidism. He has not been taking his levothyroxine for last several months. History of depression. He stopped taking his Zoloft. He felt that medicine made him sleepy so he is no longer taking. Patient continues to smoke marijuana however very less amount. Patient reported he was told to have ADHD denies, and used to take Concerta. He is requesting to restart that medicine. At present there at times he is having difficulty keeping attention in his in person class. History of obstructive sleep apnea. Currently not using CPAP machine. Past Medical History:   Diagnosis Date    Acquired hypothyroidism 5/2/2019    DILIA (generalized anxiety disorder) 5/8/2019    Moderate episode of recurrent major depressive disorder ([facility] 5/8/2019    Morbid obesity, unspecified obesity type ([facility] [address] 12/3/2019     Past Surgical History:   Procedure Laterality Date    TONSILLECTOMY      as a child     Social History     Socioeconomic History    Marital status: Single     Spouse name: None    Number of children: None    Years of education: None    Highest education level: None   Occupational History    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  Mother/Father with substance abuse.  Feels his step father only positive male influence. Hx of drug abuse himself. Now smoking marijuana on daily basis. Unable to keep job due to failed drug screens. 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 Has goal to go to school to be [employer]. Social Determinants of Health     Financial Resource Strain: Low Risk     Difficulty of Paying Living Expenses: Not hard at all   Food Insecurity: No Food Insecurity    Worried About Running Out of Food in the Last Year: Never true    Jairo of Food in the Last Year: Never true   Transportation Needs:     Lack of Transportation (Medical):  Lack of Transportation (Non-Medical):    Physical Activity:     Days of Exercise per Week:     Minutes of Exercise per Session:    Stress:     Feeling of Stress :    Social Connections:     Frequency of Communication with Friends and Family:     Frequency of Social Gatherings with Friends and Family:     Attends Latter day Services:     Active Member of Clubs or Organizations:     Attends Club or Organization Meetings:     Marital Status:    Intimate Partner Violence:     Fear of Current or Ex-Partner:     Emotionally Abused:     Physically Abused:     Sexually Abused:      Family History   Problem Relation Age of Onset    Substance Abuse Mother     Depression Mother     Kidney Disease Father     Heart Disease Father     COPD Father     High Blood Pressure Father     Stroke Father     Cancer Father         bladder    Alcohol Abuse Father        Review of Systems   Constitutional: Positive for fatigue. HENT: Negative for trouble swallowing and voice change. Respiratory: Negative for shortness of breath and wheezing. Cardiovascular: Negative for chest pain and palpitations. Gastrointestinal: Negative for abdominal pain, nausea and vomiting. Genitourinary: Negative for difficulty urinating and flank pain. Neurological: Negative for dizziness and light-headedness. Psychiatric/Behavioral: Positive for decreased concentration and sleep disturbance. OBJECTIVE:  Pulse Readings from Last 4 Encounters:   09/16/21 95   01/20/20 66   01/16/20 76   01/07/20 79     Wt Readings from Last 4 Encounters:   09/16/21 300 lb (136.1 kg)   01/20/20 291 lb (132 kg)   01/15/20 280 lb (127 kg)   01/07/20 279 lb (126.6 kg)     BP Readings from Last 4 Encounters:   09/16/21 132/80   01/20/20 132/88   01/16/20 (!) 148/78   01/07/20 (!) 153/82     Physical Exam  Vitals and nursing note reviewed. Constitutional:       General: He is not in acute distress. Appearance: He is obese. Eyes:      Conjunctiva/sclera: Conjunctivae normal.   Cardiovascular:      Rate and Rhythm: Normal rate and regular rhythm. Pulses: Normal pulses. Heart sounds: Normal heart sounds. Pulmonary:      Effort: Pulmonary effort is normal.      Breath sounds: Normal breath sounds. Musculoskeletal:      Right lower leg: No edema. Left lower leg: No edema. Neurological:      General: No focal deficit present. Mental Status: He is oriented to person, place, and time.    Psychiatric:         Mood and Affect: Mood normal.         Behavior: Behavior normal.         CBC:   Lab Results   Component Value Date    WBC 9.5 01/16/2020    HGB 14.5 01/16/2020    HCT 42.7 01/16/2020     01/16/2020     CMP:  Lab Results   Component Value Date     01/16/2020    K 4.9 01/16/2020    CL 99 01/16/2020    CO2 24 01/16/2020    ANIONGAP 13 01/16/2020    GLUCOSE 100 01/16/2020    BUN 23 01/16/2020    CREATININE 0.9 01/16/2020    GFRAA >60 01/16/2020    CALCIUM 9.3 01/16/2020    PROT 7.6 01/20/2020    LABALBU 4.7 01/20/2020    AGRATIO 1.6 05/01/2019    BILITOT 0.3 01/20/2020    ALKPHOS 47 01/20/2020    ALT 27 01/20/2020    AST 20 01/20/2020    GLOB 2.9 05/01/2019     URINALYSIS:  Lab Results   Component Value Date    GLUCOSEU Negative 05/01/2019    KETUA Negative 05/01/2019    SPECGRAV 1.026 05/01/2019 BLOODU Negative 05/01/2019    PHUR 5.5 05/01/2019    PROTEINU Negative 05/01/2019    NITRU Negative 05/01/2019    LEUKOCYTESUR Negative 05/01/2019    LABMICR Not Indicated 05/01/2019    URINETYPE Clean catch 05/01/2019     MICRO/ALB:   Lab Results   Component Value Date    LABMICR Not Indicated 05/01/2019    LABCREA 285.1 05/01/2019     TSH:   Lab Results   Component Value Date    TSHREFLEX 1.43 07/22/2019         ASSESSMENT/PLAN:   Assessment/Plan:  Karan Kemp was seen today for follow-up. Diagnoses and all orders for this visit:    Routine physical examination  -     CBC; Future  -     Comprehensive Metabolic Panel; Future  -     TSH with Reflex; Future  -     Lipid Panel; Future  -     Hemoglobin A1C; Future  -     Urinalysis; Future  -     HEPATITIS C ANTIBODY; Future  Annual PE/Wellness exam:  Discussed age appropriate preventive care including healthy diet, daily exercise, immunizations and age & gender guided screening tests. Attention deficit  Underlying risk factors obstructive sleep apnea which is untreated. Marijuana use though less amount than before. Untreated hypothyroidism  -     buPROPion (WELLBUTRIN SR) 150 MG extended release tablet; Take 1 tablet by mouth 2 times daily  -     Ambulatory referral to Psychiatry    ARIEL (obstructive sleep apnea)  Patient is advised to call sleep apnea clinic and discuss about further adjustment or modification of his sleep apnea machine. Moderate episode of recurrent major depressive disorder (HCC)  -     buPROPion (WELLBUTRIN SR) 150 MG extended release tablet; Take 1 tablet by mouth 2 times daily  -     Ambulatory referral to Psychiatry    Morbid obesity, unspecified obesity type ([address]  Lifestyle changes weight loss  Close exposure to COVID-19 virus  Patient report he received a text about exposure with another student of COVID-19 infection.  -     COVID-19; Future    Acquired hypothyroidism  -     levothyroxine (SYNTHROID) 50 MCG tablet;  Take 1 tablet by Specific Question:   Resident in a congregate (group) care setting? Answer:   No     Order Specific Question:   Pregnant: Answer:   No     Order Specific Question:   Previously tested for COVID-19? Answer:   Unknown    Ambulatory referral to Psychiatry     Referral Priority:   Routine     Referral Type:   Psychiatric     Referred to Provider:   Corinne Noun, MD     Requested Specialty:   Psychiatry     Number of Visits Requested:   1    Ambulatory referral to Urology     Referral Priority:   Routine     Referral Type:   Consult for Advice and Opinion     Referred to Provider:   Courtney Valencia MD     Requested Specialty:   Urology     Number of Visits Requested:   1     Current Outpatient Medications   Medication Sig Dispense Refill    buPROPion (WELLBUTRIN SR) 150 MG extended release tablet Take 1 tablet by mouth 2 times daily 180 tablet 1    levothyroxine (SYNTHROID) 50 MCG tablet Take 1 tablet by mouth daily 90 tablet 3     No current facility-administered medications for this visit. Return in about 6 weeks (around 10/28/2021). An After Visit Summary was printed and given to the patient. Documentation was done using voice recognition dragon software. Every effort was made to ensure accuracy; however, inadvertent  Unintentional computerized transcription errors may be present.

## 2021-09-20 DIAGNOSIS — R74.8 ELEVATED LIVER ENZYMES: Primary | ICD-10-CM

## 2021-10-22 ENCOUNTER — TELEPHONE (OUTPATIENT)
Dept: PULMONOLOGY | Age: 31
End: 2021-10-22

## 2021-10-22 NOTE — TELEPHONE ENCOUNTER
Patient called back and said he hasn't been using machine due to having a new born baby. He said once he starts using it again he will call back to reschedule. Patient was informed that if he isn't using it, the DME company may call to ask for it back or have him pay for it out of pocket.

## 2021-10-29 ENCOUNTER — OFFICE VISIT (OUTPATIENT)
Dept: INTERNAL MEDICINE CLINIC | Age: 31
End: 2021-10-29
Payer: COMMERCIAL

## 2021-10-29 VITALS
OXYGEN SATURATION: 97 % | HEART RATE: 101 BPM | BODY MASS INDEX: 42.09 KG/M2 | TEMPERATURE: 96.8 F | DIASTOLIC BLOOD PRESSURE: 88 MMHG | SYSTOLIC BLOOD PRESSURE: 124 MMHG | WEIGHT: 285 LBS

## 2021-10-29 DIAGNOSIS — R41.840 ATTENTION DEFICIT: ICD-10-CM

## 2021-10-29 DIAGNOSIS — R74.8 ELEVATED LIVER ENZYMES: Primary | ICD-10-CM

## 2021-10-29 DIAGNOSIS — E03.9 ACQUIRED HYPOTHYROIDISM: ICD-10-CM

## 2021-10-29 DIAGNOSIS — F39 MOOD DISORDER (HCC): ICD-10-CM

## 2021-10-29 DIAGNOSIS — R74.8 ELEVATED LIVER ENZYMES: ICD-10-CM

## 2021-10-29 LAB
HEPATITIS B SURFACE ANTIGEN INTERPRETATION: NORMAL
IGA: 267 MG/DL (ref 70–400)

## 2021-10-29 PROCEDURE — 99213 OFFICE O/P EST LOW 20 MIN: CPT | Performed by: INTERNAL MEDICINE

## 2021-10-29 ASSESSMENT — PATIENT HEALTH QUESTIONNAIRE - PHQ9
SUM OF ALL RESPONSES TO PHQ QUESTIONS 1-9: 5
SUM OF ALL RESPONSES TO PHQ QUESTIONS 1-9: 5
3. TROUBLE FALLING OR STAYING ASLEEP: 0
1. LITTLE INTEREST OR PLEASURE IN DOING THINGS: 1
9. THOUGHTS THAT YOU WOULD BE BETTER OFF DEAD, OR OF HURTING YOURSELF: 0
SUM OF ALL RESPONSES TO PHQ QUESTIONS 1-9: 5
5. POOR APPETITE OR OVEREATING: 0
8. MOVING OR SPEAKING SO SLOWLY THAT OTHER PEOPLE COULD HAVE NOTICED. OR THE OPPOSITE, BEING SO FIGETY OR RESTLESS THAT YOU HAVE BEEN MOVING AROUND A LOT MORE THAN USUAL: 0
SUM OF ALL RESPONSES TO PHQ9 QUESTIONS 1 & 2: 1
6. FEELING BAD ABOUT YOURSELF - OR THAT YOU ARE A FAILURE OR HAVE LET YOURSELF OR YOUR FAMILY DOWN: 0
10. IF YOU CHECKED OFF ANY PROBLEMS, HOW DIFFICULT HAVE THESE PROBLEMS MADE IT FOR YOU TO DO YOUR WORK, TAKE CARE OF THINGS AT HOME, OR GET ALONG WITH OTHER PEOPLE: 1
2. FEELING DOWN, DEPRESSED OR HOPELESS: 0
7. TROUBLE CONCENTRATING ON THINGS, SUCH AS READING THE NEWSPAPER OR WATCHING TELEVISION: 3
4. FEELING TIRED OR HAVING LITTLE ENERGY: 1

## 2021-10-29 ASSESSMENT — ENCOUNTER SYMPTOMS
WHEEZING: 0
ABDOMINAL PAIN: 0
SHORTNESS OF BREATH: 0

## 2021-10-29 NOTE — PATIENT INSTRUCTIONS
Please have the labs for liver done today.   Schedule appointment with psychologist and psychiatrist.

## 2021-10-29 NOTE — PROGRESS NOTES
Alex Horvath  1990  male  28 y.o. SUBJECTIVE:       Chief Complaint   Patient presents with    Hypothyroidism    Depression       HPI:  Patient feels about 50% improvement of his depression. He still suffer from attention issues. He did not make appointment with psychologist or psychiatrist.  He feels Wellbutrin has been helping. He denies any suicidal ideation or plan for last couple of weeks. He is taking his thyroid medicine regularly. History of elevated liver enzymes. Patient have not done any of the lab ordered yet. Past Medical History:   Diagnosis Date    Acquired hypothyroidism 5/2/2019    DILIA (generalized anxiety disorder) 5/8/2019    Moderate episode of recurrent major depressive disorder ([facility] [address] 5/8/2019    Morbid obesity, unspecified obesity type ([facility] 12/3/2019     Past Surgical History:   Procedure Laterality Date    TONSILLECTOMY      as a child     Social History     Socioeconomic History    Marital status: Single     Spouse name: None    Number of children: None    Years of education: None    Highest education level: None   Occupational History    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 Mother/Father with substance abuse. Feels his step father only positive male influence. Hx of drug abuse himself. Now smoking marijuana on daily basis. Unable to keep job due to failed drug screens. 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 Has goal to go to school to be [employer].       Social Determinants of Health     Financial Resource Strain: Low Risk     Difficulty of Paying Living Expenses: Not hard at all   Food Insecurity: No Food Insecurity    Worried About Running Out of Food in the Last Year: Never true    Jairo of Food in the Last Year: Never true   Transportation Needs:     Lack of Transportation (Medical):  Lack of Transportation (Non-Medical):    Physical Activity:     Days of Exercise per Week:     Minutes of Exercise per Session:    Stress:     Feeling of Stress :    Social Connections:     Frequency of Communication with Friends and Family:     Frequency of Social Gatherings with Friends and Family:     Attends Evangelical Services:     Active Member of Clubs or Organizations:     Attends Club or Organization Meetings:     Marital Status:    Intimate Partner Violence:     Fear of Current or Ex-Partner:     Emotionally Abused:     Physically Abused:     Sexually Abused:      Family History   Problem Relation Age of Onset    Substance Abuse Mother     Depression Mother     Kidney Disease Father     Heart Disease Father     COPD Father     High Blood Pressure Father     Stroke Father     Cancer Father         bladder    Alcohol Abuse Father        Review of Systems   Constitutional: Negative for diaphoresis and unexpected weight change. Respiratory: Negative for shortness of breath and wheezing. Cardiovascular: Negative for chest pain and palpitations. Gastrointestinal: Negative for abdominal pain. Neurological: Negative for dizziness and light-headedness.        OBJECTIVE:  Pulse Readings from Last 4 Encounters:   10/29/21 101   09/16/21 95   01/20/20 66   01/16/20 76     Wt Readings from Last 4 Encounters:   10/29/21 285 lb (129.3 kg)   09/16/21 300 lb (136.1 kg)   01/20/20 291 lb (132 kg)   01/15/20 280 lb (127 kg)     BP Readings from Last 4 Encounters:   10/29/21 124/88   09/16/21 132/80   01/20/20 132/88   01/16/20 (!) 148/78     Physical Exam  Vitals reviewed. Constitutional:       Appearance: He is obese. Eyes:      Conjunctiva/sclera: Conjunctivae normal.   Cardiovascular:      Rate and Rhythm: Normal rate and regular rhythm. Pulses: Normal pulses. Heart sounds: Normal heart sounds. Pulmonary:      Effort: Pulmonary effort is normal.      Breath sounds: Normal breath sounds. Abdominal:      Tenderness: There is no abdominal tenderness. There is no guarding or rebound. Musculoskeletal:      Right lower leg: No edema. Left lower leg: No edema. Neurological:      General: No focal deficit present. Mental Status: He is alert and oriented to person, place, and time.          CBC:   Lab Results   Component Value Date    WBC 6.9 09/17/2021    HGB 15.0 09/17/2021    HCT 44.9 09/17/2021     09/17/2021     CMP:  Lab Results   Component Value Date     09/17/2021    K 4.3 09/17/2021     09/17/2021    CO2 23 09/17/2021    ANIONGAP 14 09/17/2021    GLUCOSE 99 09/17/2021    BUN 16 09/17/2021    CREATININE 0.8 09/17/2021    GFRAA >60 09/17/2021    CALCIUM 9.6 09/17/2021    PROT 7.3 09/17/2021    LABALBU 4.5 09/17/2021    AGRATIO 1.6 09/17/2021    BILITOT 0.4 09/17/2021    ALKPHOS 49 09/17/2021    ALT 70 09/17/2021    AST 42 09/17/2021    GLOB 2.8 09/17/2021     URINALYSIS:  Lab Results   Component Value Date    GLUCOSEU Negative 09/17/2021    KETUA Negative 09/17/2021    SPECGRAV 1.019 09/17/2021    BLOODU Negative 09/17/2021    PHUR 6.0 09/17/2021    PROTEINU Negative 09/17/2021    NITRU Negative 09/17/2021    LEUKOCYTESUR Negative 09/17/2021    LABMICR Not Indicated 09/17/2021    URINETYPE [employer] 09/17/2021     HBA1C:   Lab Results   Component Value Date    LABA1C 5.2 09/17/2021    .5 09/17/2021     MICRO/ALB:   Lab Results   Component Value Date    LABMICR Not Indicated 09/17/2021    LABCREA 285.1 05/01/2019     LIPID:  Lab Results   Component Value Date    CHOL 197 09/17/2021    TRIG 159 09/17/2021 HDL 35 09/17/2021    LDLCALC 130 09/17/2021    LABVLDL 32 09/17/2021     TSH:   Lab Results   Component Value Date    TSHREFLEX 7.50 09/17/2021         ASSESSMENT/PLAN:    Toi Chu was seen today for hypothyroidism and depression. Diagnoses and all orders for this visit:    Elevated liver enzymes  Patient will get all the blood work as advised since last visit. Acquired hypothyroidism  -     TSH with Reflex; Future  May need medication adjustment. Attention deficit  Mood disorder ([address]  Some improvement of symptoms with Wellbutrin. No medication side effect at present. For the first week of use of Wellbutrin he felt maybe suicidal ideation which completely resolved now. Patient  will make appointment with psychologist and psychiatrist.              Orders Placed This Encounter   Procedures    TSH with Reflex     Standing Status:   Future     Number of Occurrences:   1     Standing Expiration Date:   10/29/2022     Current Outpatient Medications   Medication Sig Dispense Refill    buPROPion (WELLBUTRIN SR) 150 MG extended release tablet Take 1 tablet by mouth 2 times daily 180 tablet 1    levothyroxine (SYNTHROID) 50 MCG tablet Take 1 tablet by mouth daily 90 tablet 3     No current facility-administered medications for this visit. Return in about 3 months (around 1/29/2022). An After Visit Summary was printed and given to the patient. Documentation was done using voice recognition dragon software. Every effort was made to ensure accuracy; however, inadvertent  Unintentional computerized transcription errors may be present.

## 2021-10-30 LAB
ALBUMIN SERPL-MCNC: 4.5 G/DL (ref 3.4–5)
ALP BLD-CCNC: 60 U/L (ref 40–129)
ALT SERPL-CCNC: 48 U/L (ref 10–40)
ANTI-NUCLEAR ANTIBODY (ANA): NEGATIVE
AST SERPL-CCNC: 30 U/L (ref 15–37)
BILIRUB SERPL-MCNC: 0.4 MG/DL (ref 0–1)
BILIRUBIN DIRECT: <0.2 MG/DL (ref 0–0.3)
BILIRUBIN, INDIRECT: ABNORMAL MG/DL (ref 0–1)
FERRITIN: 310.9 NG/ML (ref 30–400)
GAMMA GLUTAMYL TRANSFERASE: 24 U/L (ref 8–61)
T4 FREE: 1.2 NG/DL (ref 0.9–1.8)
TISSUE TRANSGLUTAMINASE IGA: <0.5 U/ML (ref 0–14)
TOTAL PROTEIN: 7.7 G/DL (ref 6.4–8.2)
TSH REFLEX: 5.48 UIU/ML (ref 0.27–4.2)

## 2021-10-31 LAB
HEPATITIS C VIRUS AB BY CIA INDEX: 0.1 IV
HEPATITIS C VIRUS AB BY CIA INTERPRETATION: NEGATIVE

## 2021-11-02 LAB
ALPHA-1 ANTITRYPSIN: 128 MG/DL (ref 90–200)
CERULOPLASMIN: 25 MG/DL (ref 15–30)
F-ACTIN AB IGG: 7 UNITS (ref 0–19)

## 2021-11-03 DIAGNOSIS — E03.9 ACQUIRED HYPOTHYROIDISM: Primary | ICD-10-CM

## 2021-11-03 LAB — COPPER: 121.1 UG/DL (ref 70–140)

## 2021-11-03 RX ORDER — LEVOTHYROXINE SODIUM 0.07 MG/1
75 TABLET ORAL DAILY
Qty: 90 TABLET | Refills: 1 | Status: SHIPPED | OUTPATIENT
Start: 2021-11-03 | End: 2022-04-28

## 2022-01-07 ENCOUNTER — OFFICE VISIT (OUTPATIENT)
Dept: PSYCHIATRY | Age: 32
End: 2022-01-07
Payer: COMMERCIAL

## 2022-01-07 VITALS
HEART RATE: 85 BPM | OXYGEN SATURATION: 98 % | WEIGHT: 299 LBS | SYSTOLIC BLOOD PRESSURE: 138 MMHG | DIASTOLIC BLOOD PRESSURE: 80 MMHG | TEMPERATURE: 97.6 F | BODY MASS INDEX: 44.28 KG/M2 | HEIGHT: 69 IN

## 2022-01-07 DIAGNOSIS — F90.2 ATTENTION DEFICIT HYPERACTIVITY DISORDER (ADHD), COMBINED TYPE: Primary | ICD-10-CM

## 2022-01-07 DIAGNOSIS — F32.4 MAJOR DEPRESSIVE DISORDER WITH SINGLE EPISODE, IN PARTIAL REMISSION (HCC): ICD-10-CM

## 2022-01-07 PROCEDURE — 99205 OFFICE O/P NEW HI 60 MIN: CPT | Performed by: PSYCHIATRY & NEUROLOGY

## 2022-01-07 RX ORDER — BUPROPION HYDROCHLORIDE 200 MG/1
200 TABLET, EXTENDED RELEASE ORAL 2 TIMES DAILY
Qty: 60 TABLET | Refills: 1 | Status: SHIPPED | OUTPATIENT
Start: 2022-01-07 | End: 2022-03-08

## 2022-01-07 NOTE — PROGRESS NOTES
PSYCHIATRY INITIAL EVALUATION    Fern López  1990  01/07/22  PCP: Jason Moscoso MD    CC: New Patient      ASSESSMENT:   27 yo M with hx of depression, ADHD. However, influencing factors to his ADHD include early onset of and chronic use of MJ - which may have been a major contributing factor, as well as untreated ARIEL. He is benefiting from wellbutrin with good tolerability and overall is functioning better, but could use a little better control of his concentration issues. 1. Attention deficit hyperactive disorder, combined type  2. Major depressive disorder, single episode, in partial remission    PLAN:   1. Increase wellbutrin SR to 200mg / 150mg for 1 week, then increase to 200mg BID. He can increase to 300mg / 150mg after 2 weeks if tolerated. We will eventually then change to wellbutrin XL 450mg daily if this dose is tolerated and helpful. Future consideration is addition of intuniv or changing to atomoxetine. I spent 60 min in this visit including face to face time, documentation and orders. Medication Monitoring:    - OARRS reviewed, no issues noted      Follow-up: RTC in 6 weeks    ____________________________________________________________________________    HPI:   context: Pt is a 27 yo M with hx of depression, ADHD, presenting as a referral from Dr. Max Osei.     associated symptoms:   Pt had established primary care with Dr. Max Osei in 2019 after 10 yrs of never seeing a doctor. Part of this was influenced by recent stressors and depression / anxiety he was experiencing at the time, largely influenced by his father having a recent MVA and head injury and cognitive changes. When he started to go to school about 1 yr ago, he really struggled with his ability to pay attention and focus in class - felt he was listening and even asking questions but he could not retain anything told to him.  He had been a chronic MJ user, at least once daily, since age 16, and decided to quit in August of last yr to improve his functioning in school. Also with combined depressive symptoms and concentration problems at the time, he was started on wellbutrin by Dr. Max Osei which he has been taking for the last 4 months. Prior to wellbutrin he had been dealing with depressed mood, low motivation and drive, poor focus, procrastination, being easily distracted. For the first 1-2 weeks on it he felt marked more depressed even to the point of not wanting to be alive but denies that he wanted to harm himself. This recovered and he started to see a lot of benefit and improvement going into 3-4 weeks after taking it. He notices he can initiate tasks easier, can focus better in class, and procrastinates less, has better follow through with tasks. He notes long standing issues with remember timing of things from the past, having memories of past events in his childhood, and low motivation. He continues to report ADHD symptoms that include fidgeting often, delaying tasks, remembering obligations, being easily distracted by his environment, interrupting others and talking too much in social situations, keeping attention on boring repetitive work, paying attention even when people are talking directly to him.     modifying factors: he does have a dx of hypothyroidism and he is adherent with his treatment. He has ARIEL but he is non-adherent with his CPAP and can't tolerate use of it. He's had a rough time with his relationship with is father over the last 5-7 yrs. But he feels it is a little better and father is becoming a little more self aware of his problems.      Timing: subacute on chronic  duration: worse over the last 1 yr  severity: moderate    ROS:   GEN: no fevers or chills HEENT: no vision or hearing prob CV: no cp RESP: no dyspnea : no dysuria MSK: no muscle or joint pain GI: no n/v Skin: no rashes NEURO: no tremors ENDO: no weight changes    Past Psychiatric History:   Hosp: no prior hospitalizations  Diagnoses: depression, anxiety, ADHD (dx age 16 by pediatrician)   Med trials: concerta age 16 briefly (did not like how it dulled out his personality, so stopped it after 1 month), sertraline (helpful initially, but with higher dosing made tired and had sexual side effects), paxil, wellbutrin  Outpt: No prior psych  NSSI: denies  Suicide Attempts: denies    Past Medical History:   Diagnosis Date    Acquired hypothyroidism 5/2/2019    DILIA (generalized anxiety disorder) 5/8/2019    Moderate episode of recurrent major depressive disorder ([facility] [address] 5/8/2019    Morbid obesity, unspecified obesity type ([facility] [address] 12/3/2019     Past Surgical History:   Procedure Laterality Date    TONSILLECTOMY      as a child     Social History:   Grew up in [place], [place]. Father was in and out of [facility] in his teenage years and addicted to crack cocaine, but got clean around when he was 13 which gradually improved their relationship. Mother lives with cousin in [place]. In the past she went through a 1 yr period of deciding to be a lesbian, then retracting it 1 yr later, but during this time was abusing drugs, in particular xanax. More stable now, but was a pretty choatic time in the family for that period of time. Siblings: older brother 8 yrs+, younger brother (feels is the most stable one in the family), and cousin sister who grew up with them. Social: minimal. Really doesn't identify with having friends. Marital: has a fiance. They have been together 5-6 yrs. Living: lives with  and [facility]. Substance abuse history:   ETOH: denies  Illicits: MJ from age 16 until Aug 2021. Used once at [facility]. Switched to delta 8 for a short period. Tobacco: uses a nicotine vape pen daily.      Family History   Problem Relation Age of Onset    Substance Abuse Mother     Depression Mother     Kidney Disease Father     Heart Disease Father     COPD Father     High Blood Pressure Father     Stroke Father     Cancer Father         bladder    Alcohol Abuse Father     Substance Abuse Brother      Allergies   Allergen Reactions    Ceclor [Cefaclor]      Current Outpatient Medications on File Prior to Visit   Medication Sig Dispense Refill    levothyroxine (SYNTHROID) 75 MCG tablet Take 1 tablet by mouth daily 90 tablet 1     No current facility-administered medications on file prior to visit. OBJECTIVE:  Vitals:    01/07/22 0957   BP: 138/80   Pulse: 85   Temp: 97.6 °F (36.4 °C)   SpO2: 98%   Weight: 299 lb (135.6 kg)   Height: 5' 9\" (1.753 m)     ASRS v1.1: Part A 4/6, Part B 5/12    MSE:   Appearance    Casually dressed, appropriately groomed  Motor: No abnormal movements, tics or mannerisms.   Speech    Normal rate, rhythm, and vol  Language   No aphasia  Mood/Affect   ok / full quality, good motility and range  Thought Process    linear  Thought Content    No delusions , no SI/HI  Associations   linear  Attention/Concentration   Intact  Orientation    AxOx4  Memory   Grossly intact to recent and remote content  Fund of Knowledge    intact  Insight/Judgement   good / good    Labs:     Lab Results   Component Value Date    CHOL 197 09/17/2021     Lab Results   Component Value Date    TRIG 159 (H) 09/17/2021     Lab Results   Component Value Date    HDL 35 (L) 09/17/2021     Lab Results   Component Value Date    LDLCALC 130 (H) 09/17/2021     Lab Results   Component Value Date    LABVLDL 32 09/17/2021     No results found for: [facility]    Lab Results   Component Value Date    LABA1C 5.2 09/17/2021     Lab Results   Component Value Date    .5 09/17/2021       No results found for: TSHFT4, TSH    Lab Results   Component Value Date    FJQPORSW96 725 05/01/2019     Lab Results   Component Value Date    FOLATE 8.15 05/01/2019           Imaging: no head imaging on file            Adrianne Chance MD   Psychiatry

## 2022-01-31 ENCOUNTER — OFFICE VISIT (OUTPATIENT)
Dept: INTERNAL MEDICINE CLINIC | Age: 32
End: 2022-01-31
Payer: COMMERCIAL

## 2022-01-31 VITALS
HEIGHT: 69 IN | SYSTOLIC BLOOD PRESSURE: 136 MMHG | WEIGHT: 303 LBS | DIASTOLIC BLOOD PRESSURE: 85 MMHG | BODY MASS INDEX: 44.88 KG/M2 | HEART RATE: 74 BPM | OXYGEN SATURATION: 94 %

## 2022-01-31 DIAGNOSIS — E03.9 ACQUIRED HYPOTHYROIDISM: ICD-10-CM

## 2022-01-31 DIAGNOSIS — E66.01 MORBID OBESITY, UNSPECIFIED OBESITY TYPE (HCC): ICD-10-CM

## 2022-01-31 DIAGNOSIS — R74.8 ELEVATED LIVER ENZYMES: ICD-10-CM

## 2022-01-31 DIAGNOSIS — R74.8 ELEVATED LIVER ENZYMES: Primary | ICD-10-CM

## 2022-01-31 PROCEDURE — 99214 OFFICE O/P EST MOD 30 MIN: CPT | Performed by: INTERNAL MEDICINE

## 2022-01-31 ASSESSMENT — ENCOUNTER SYMPTOMS
ABDOMINAL PAIN: 0
VOICE CHANGE: 0
VOMITING: 0
WHEEZING: 0
NAUSEA: 0
TROUBLE SWALLOWING: 0

## 2022-01-31 NOTE — PROGRESS NOTES
Payton Amrenta  1990  male  28 y.o. SUBJECTIVE:       Chief Complaint   Patient presents with    3 Month Follow-Up       HPI:  Follow-up visit. History of elevated liver enzymes. Patient continues to gain weight. Denies any right upper quadrant pain. He did not have ultrasonogram of the liver. Patient continues to have difficulty in ADHD symptoms. He is gradually increasing bupropion. He also following up with the psychiatrist.    Past Medical History:   Diagnosis Date    Acquired hypothyroidism 5/2/2019    DILIA (generalized anxiety disorder) 5/8/2019    Moderate episode of recurrent major depressive disorder ([facility] [address] 5/8/2019    Morbid obesity, unspecified obesity type ([facility] [address] 12/3/2019     Past Surgical History:   Procedure Laterality Date    TONSILLECTOMY      as a child     Social History     Socioeconomic History    Marital status: Single     Spouse name: None    Number of children: None    Years of education: None    Highest education level: None   Occupational History    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 Mother/Father with substance abuse. Feels his step father only positive male influence. Hx of drug abuse himself. Now smoking marijuana on daily basis. Unable to keep job due to failed drug screens. 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 Has goal to go to school to be [employer].       Social Determinants of Health     Financial Resource Strain: Low Risk     Difficulty of Paying Living Expenses: Not hard at all   Food Insecurity: No Food Insecurity    Worried About [address] in the Last Year: Never true    [place] of Food in the Last Year: Never true   Transportation Needs:     Lack of Transportation (Medical): Not on file    Lack of Transportation (Non-Medical): Not on file   Physical Activity:     Days of Exercise per Week: Not on file    Minutes of Exercise per Session: Not on file   Stress:     Feeling of Stress : Not on file   Social Connections:     Frequency of Communication with Friends and Family: Not on file    Frequency of Social Gatherings with Friends and Family: Not on file    Attends Jew Services: Not on file    Active Member of [address] [place] [address] or Organizations: Not on file    Attends Club or Organization Meetings: Not on file    Marital Status: Not on file   Intimate Partner Violence:     Fear of Current or Ex-Partner: Not on file    Emotionally Abused: Not on file    Physically Abused: Not on file    Sexually Abused: Not on file   Housing Stability:     Unable to Pay for Housing in the Last Year: Not on file    Number of [place] in the Last Year: Not on file    Unstable Housing in the Last Year: Not on file     Family History   Problem Relation Age of Onset    Substance Abuse Mother     Depression Mother     Kidney Disease Father     Heart Disease Father     COPD Father     High Blood Pressure Father     Stroke Father     Cancer Father         bladder    Alcohol Abuse Father     Substance Abuse Brother        Review of Systems   Constitutional: Negative for unexpected weight change. HENT: Negative for trouble swallowing and voice change. Respiratory: Negative for wheezing. Cardiovascular: Negative for chest pain and palpitations. Gastrointestinal: Negative for abdominal pain, nausea and vomiting.        OBJECTIVE:  Pulse Readings from Last 4 Encounters:   01/31/22 74   01/07/22 85   10/29/21 101   09/16/21 95     Wt Readings from Last 4 Encounters:   01/31/22 (!) 303 lb (137.4 kg)   01/07/22 299 lb (135.6 kg)   10/29/21 285 lb (129.3 kg)   09/16/21 300 lb (136.1 kg)     BP Readings from Last 4 Encounters:   01/31/22 136/85   01/07/22 138/80   10/29/21 124/88   09/16/21 132/80     Physical Exam  Vitals and nursing note reviewed. Constitutional:       Appearance: He is not ill-appearing. Eyes:      Conjunctiva/sclera: Conjunctivae normal.   Cardiovascular:      Rate and Rhythm: Normal rate and regular rhythm. Pulses: Normal pulses. Heart sounds: Normal heart sounds. Pulmonary:      Effort: Pulmonary effort is normal.      Breath sounds: Normal breath sounds. Abdominal:      General: Bowel sounds are normal.   Neurological:      General: No focal deficit present. Mental Status: He is alert and oriented to person, place, and time.          CBC:   Lab Results   Component Value Date    WBC 6.9 09/17/2021    HGB 15.0 09/17/2021    HCT 44.9 09/17/2021     09/17/2021     CMP:  Lab Results   Component Value Date     09/17/2021    K 4.3 09/17/2021     09/17/2021    CO2 23 09/17/2021    ANIONGAP 14 09/17/2021    GLUCOSE 99 09/17/2021    BUN 16 09/17/2021    CREATININE 0.8 09/17/2021    GFRAA >60 09/17/2021    CALCIUM 9.6 09/17/2021    PROT 7.7 10/29/2021    LABALBU 4.5 10/29/2021    AGRATIO 1.6 09/17/2021    BILITOT 0.4 10/29/2021    ALKPHOS 60 10/29/2021    ALT 48 10/29/2021    AST 30 10/29/2021    GLOB 2.8 09/17/2021     URINALYSIS:  Lab Results   Component Value Date    GLUCOSEU Negative 09/17/2021    KETUA Negative 09/17/2021    SPECGRAV 1.019 09/17/2021    BLOODU Negative 09/17/2021    PHUR 6.0 09/17/2021    PROTEINU Negative 09/17/2021    NITRU Negative 09/17/2021    LEUKOCYTESUR Negative 09/17/2021    LABMICR Not Indicated 09/17/2021    URINETYPE [employer] 09/17/2021     HBA1C:   Lab Results   Component Value Date    LABA1C 5.2 09/17/2021    .5 09/17/2021     MICRO/ALB:   Lab Results   Component Value Date    LABMICR Not Indicated 09/17/2021    LABCREA 285.1 05/01/2019     LIPID:  Lab Results   Component Value Date    CHOL 197 09/17/2021    TRIG 159 09/17/2021    HDL 35 09/17/2021    LDLCALC 130 09/17/2021    LABVLDL 32 09/17/2021     TSH:   Lab Results   Component Value Date    TSHREFLEX 5.48 10/29/2021         ASSESSMENT/PLAN:    Juliette Helm was seen today for 3 month follow-up. Diagnoses and all orders for this visit:    Elevated liver enzymes  Possible fatty liver. Diet lifestyle changes weight loss. R-     HEPATIC FUNCTION PANEL; Future  -     US LIVER; Future    Acquired hypothyroidism  -     TSH with Reflex; Future  Last TSH is not within the therapeutic level. Patient currently taking levothyroxine on empty stomach  Morbid obesity, unspecified obesity type ([address]  Therapeutic lifestyle changes. Additional information  Calorie restricted diet. Increase physical activities. Patient is not motivated to establish with weight loss clinic at this time            Orders Placed This Encounter   Procedures    US LIVER     This procedure can be scheduled via [employer]. Access your [employer] account by visiting [email or web address]. Standing Status:   Future     Standing Expiration Date:   1/31/2023    TSH with Reflex     Standing Status:   Future     Number of Occurrences:   1     Standing Expiration Date:   1/31/2023    HEPATIC FUNCTION PANEL     Standing Status:   Future     Number of Occurrences:   1     Standing Expiration Date:   1/31/2023     Current Outpatient Medications   Medication Sig Dispense Refill    buPROPion (WELLBUTRIN SR) 200 MG extended release tablet Take 1 tablet by mouth 2 times daily 60 tablet 1    levothyroxine (SYNTHROID) 75 MCG tablet Take 1 tablet by mouth daily 90 tablet 1     No current facility-administered medications for this visit. Return in about 3 months (around 4/30/2022). An After Visit Summary was printed and given to the patient.   Documentation was done using voice recognition dragon software. Every effort was made to ensure accuracy; however, inadvertent  Unintentional computerized transcription errors may be present.

## 2022-02-01 LAB
ALBUMIN SERPL-MCNC: 4.5 G/DL (ref 3.4–5)
ALP BLD-CCNC: 52 U/L (ref 40–129)
ALT SERPL-CCNC: 43 U/L (ref 10–40)
AST SERPL-CCNC: 33 U/L (ref 15–37)
BILIRUB SERPL-MCNC: 0.4 MG/DL (ref 0–1)
BILIRUBIN DIRECT: <0.2 MG/DL (ref 0–0.3)
BILIRUBIN, INDIRECT: ABNORMAL MG/DL (ref 0–1)
TOTAL PROTEIN: 7.6 G/DL (ref 6.4–8.2)
TSH REFLEX: 3.66 UIU/ML (ref 0.27–4.2)

## 2022-02-03 ENCOUNTER — HOSPITAL ENCOUNTER (OUTPATIENT)
Dept: ULTRASOUND IMAGING | Age: 32
Discharge: HOME | End: 2022-02-03
Payer: MEDICARE

## 2022-02-03 DIAGNOSIS — R74.8 ELEVATED LIVER ENZYMES: ICD-10-CM

## 2022-02-03 PROBLEM — K76.0 HEPATIC STEATOSIS: Status: ACTIVE | Noted: 2022-02-03

## 2022-02-03 PROCEDURE — 76705 ECHO EXAM OF ABDOMEN: CPT

## 2022-03-08 RX ORDER — BUPROPION HYDROCHLORIDE 200 MG/1
TABLET, EXTENDED RELEASE ORAL
Qty: 60 TABLET | Refills: 1 | Status: SHIPPED | OUTPATIENT
Start: 2022-03-08 | End: 2022-05-05

## 2022-04-28 DIAGNOSIS — E03.9 ACQUIRED HYPOTHYROIDISM: ICD-10-CM

## 2022-04-28 RX ORDER — LEVOTHYROXINE SODIUM 0.07 MG/1
75 TABLET ORAL DAILY
Qty: 90 TABLET | Refills: 1 | Status: SHIPPED | OUTPATIENT
Start: 2022-04-28

## 2022-05-02 ENCOUNTER — OFFICE VISIT (OUTPATIENT)
Dept: INTERNAL MEDICINE CLINIC | Age: 32
End: 2022-05-02
Payer: MEDICARE

## 2022-05-02 VITALS
DIASTOLIC BLOOD PRESSURE: 88 MMHG | HEART RATE: 91 BPM | HEIGHT: 69 IN | WEIGHT: 294 LBS | BODY MASS INDEX: 43.55 KG/M2 | OXYGEN SATURATION: 97 % | SYSTOLIC BLOOD PRESSURE: 120 MMHG

## 2022-05-02 DIAGNOSIS — E78.5 DYSLIPIDEMIA: ICD-10-CM

## 2022-05-02 DIAGNOSIS — E03.9 ACQUIRED HYPOTHYROIDISM: Primary | ICD-10-CM

## 2022-05-02 DIAGNOSIS — K76.0 HEPATIC STEATOSIS: ICD-10-CM

## 2022-05-02 PROCEDURE — 4004F PT TOBACCO SCREEN RCVD TLK: CPT | Performed by: INTERNAL MEDICINE

## 2022-05-02 PROCEDURE — 99213 OFFICE O/P EST LOW 20 MIN: CPT | Performed by: INTERNAL MEDICINE

## 2022-05-02 PROCEDURE — G8417 CALC BMI ABV UP PARAM F/U: HCPCS | Performed by: INTERNAL MEDICINE

## 2022-05-02 PROCEDURE — G8427 DOCREV CUR MEDS BY ELIG CLIN: HCPCS | Performed by: INTERNAL MEDICINE

## 2022-05-02 RX ORDER — ATORVASTATIN CALCIUM 20 MG/1
20 TABLET, FILM COATED ORAL DAILY
Qty: 30 TABLET | Refills: 3 | Status: SHIPPED | OUTPATIENT
Start: 2022-05-02 | End: 2022-08-18

## 2022-05-02 ASSESSMENT — ENCOUNTER SYMPTOMS
VOMITING: 0
ABDOMINAL PAIN: 0
NAUSEA: 0
WHEEZING: 0
SHORTNESS OF BREATH: 0

## 2022-05-02 NOTE — PROGRESS NOTES
Shasta Paz  1990  male  28 y.o. SUBJECTIVE:       Chief Complaint   Patient presents with    3 Month Follow-Up    Weight Loss     intentional, fluctuates due to stress eating. down 9#       HPI:  Follow-up visit. Patient just finished his schooling for this semester. He has been evaluated by psychiatrist for anxiety as well as depression and ADHD. Currently taking Wellbutrin 400 mg/day. He continues to suffer from stress and anxiety. Denies any suicidal or homicidal ideation. Denies manic symptoms. History of mildly elevated liver enzymes and ultrasonogram shows fatty liver. Patient has been trying to do therapeutic lifestyle changes and weight loss. Past Medical History:   Diagnosis Date    Acquired hypothyroidism 5/2/2019    DILIA (generalized anxiety disorder) 5/8/2019    Moderate episode of recurrent major depressive disorder ([facility] [address] 5/8/2019    Morbid obesity, unspecified obesity type ([facility] 12/3/2019     Past Surgical History:   Procedure Laterality Date    TONSILLECTOMY      as a child     Social History     Socioeconomic History    Marital status: Single     Spouse name: None    Number of children: None    Years of education: None    Highest education level: None   Occupational History    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 Mother/Father with substance abuse. Feels his step father only positive male influence. Hx of drug abuse himself. Now smoking marijuana on daily basis.   Unable to keep job due to failed drug screens. 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 Has goal to go to school to be [employer]. Social Determinants of Health     Financial Resource Strain: Low Risk     Difficulty of Paying Living Expenses: Not hard at all   Food Insecurity: No Food Insecurity    Worried About Running Out of Food in the Last Year: Never true    [place]airo of Food in the Last Year: Never true   Transportation Needs:     Lack of Transportation (Medical): Not on file    Lack of Transportation (Non-Medical): Not on file   Physical Activity:     Days of Exercise per Week: Not on file    Minutes of Exercise per Session: Not on file   Stress:     Feeling of Stress : Not on file   Social Connections:     Frequency of Communication with Friends and Family: Not on file    Frequency of Social Gatherings with Friends and Family: Not on file    Attends Episcopal Services: Not on file    Active Member of [address] [place] [address] [employer] book or Organizations: Not on file    Attends Club or Organization Meetings: Not on file    Marital Status: Not on file   Intimate Partner Violence:     Fear of Current or Ex-Partner: Not on file    Emotionally Abused: Not on file    Physically Abused: Not on file    Sexually Abused: Not on file   Housing Stability:     Unable to Pay for Housing in the Last Year: Not on file    Number of [place] in the Last Year: Not on file    Unstable Housing in the Last Year: Not on file     Family History   Problem Relation Age of Onset    Substance Abuse Mother     Depression Mother     Kidney Disease Father     Heart Disease Father     COPD Father     High Blood Pressure Father     Stroke Father     Cancer Father         bladder    Alcohol Abuse Father     Substance Abuse Brother        Review of Systems   Constitutional: Negative for diaphoresis and unexpected weight change. Respiratory: Negative for shortness of breath and wheezing. Cardiovascular: Negative for chest pain and palpitations.    Gastrointestinal: Negative for abdominal pain, nausea and vomiting. Neurological: Negative for dizziness and light-headedness. OBJECTIVE:  Pulse Readings from Last 4 Encounters:   05/02/22 91   01/31/22 74   01/07/22 85   10/29/21 101     Wt Readings from Last 4 Encounters:   05/02/22 294 lb (133.4 kg)   01/31/22 (!) 303 lb (137.4 kg)   01/07/22 299 lb (135.6 kg)   10/29/21 285 lb (129.3 kg)     BP Readings from Last 4 Encounters:   05/02/22 120/88   01/31/22 136/85   01/07/22 138/80   10/29/21 124/88     Physical Exam  Vitals and nursing note reviewed. Constitutional:       Appearance: He is obese. He is not ill-appearing. Eyes:      Conjunctiva/sclera: Conjunctivae normal.   Cardiovascular:      Rate and Rhythm: Normal rate and regular rhythm. Pulses: Normal pulses. Heart sounds: Normal heart sounds. Pulmonary:      Effort: Pulmonary effort is normal.      Breath sounds: Normal breath sounds. Abdominal:      General: Bowel sounds are normal.   Musculoskeletal:      Right lower leg: No edema. Left lower leg: No edema. Neurological:      General: No focal deficit present. Mental Status: He is alert and oriented to person, place, and time.          CBC:   Lab Results   Component Value Date    WBC 6.9 09/17/2021    HGB 15.0 09/17/2021    HCT 44.9 09/17/2021     09/17/2021     CMP:  Lab Results   Component Value Date     09/17/2021    K 4.3 09/17/2021     09/17/2021    CO2 23 09/17/2021    ANIONGAP 14 09/17/2021    GLUCOSE 99 09/17/2021    BUN 16 09/17/2021    CREATININE 0.8 09/17/2021    GFRAA >60 09/17/2021    CALCIUM 9.6 09/17/2021    PROT 7.6 01/31/2022    LABALBU 4.5 01/31/2022    AGRATIO 1.6 09/17/2021    BILITOT 0.4 01/31/2022    ALKPHOS 52 01/31/2022    ALT 43 01/31/2022    AST 33 01/31/2022    GLOB 2.8 09/17/2021     URINALYSIS:  Lab Results   Component Value Date    GLUCOSEU Negative 09/17/2021    KETUA Negative 09/17/2021    SPECGRAV 1.019 09/17/2021    BLOODU Negative 09/17/2021    PHUR 6.0 09/17/2021    PROTEINU Negative 09/17/2021    NITRU Negative 09/17/2021    LEUKOCYTESUR Negative 09/17/2021    LABMICR Not Indicated 09/17/2021    URINETYPE [employer] 09/17/2021     HBA1C:   Lab Results   Component Value Date    LABA1C 5.2 09/17/2021    .5 09/17/2021     MICRO/ALB:   Lab Results   Component Value Date    LABMICR Not Indicated 09/17/2021    LABCREA 285.1 05/01/2019     LIPID:  Lab Results   Component Value Date    CHOL 197 09/17/2021    TRIG 159 09/17/2021    HDL 35 09/17/2021    LDLCALC 130 09/17/2021    LABVLDL 32 09/17/2021     TSH:   Lab Results   Component Value Date    TSHREFLEX 3.66 01/31/2022         ASSESSMENT/PLAN:  Assessment/Plan:  Bertha was seen today for 3 month follow-up and weight loss. Diagnoses and all orders for this visit:    Acquired hypothyroidism  Last TSH within the therapeutic range. Continue current levothyroxine on empty stomach. Hepatic steatosis  Diet therapeutic lifestyle changes. Patient will get lab work in 6 to 8 weeks. -     atorvastatin (LIPITOR) 20 MG tablet; Take 1 tablet by mouth daily  -     Lipid, Fasting; Future  -     Hepatic Function Panel; Future    Dyslipidemia  -     atorvastatin (LIPITOR) 20 MG tablet; Take 1 tablet by mouth daily  -     Lipid, Fasting; Future  -     Hepatic Function Panel;  Future            Orders Placed This Encounter   Procedures    Lipid, Fasting     Standing Status:   Future     Standing Expiration Date:   8/2/2022    Hepatic Function Panel     Standing Status:   Future     Standing Expiration Date:   8/2/2022     Current Outpatient Medications   Medication Sig Dispense Refill    atorvastatin (LIPITOR) 20 MG tablet Take 1 tablet by mouth daily 30 tablet 3    levothyroxine (SYNTHROID) 75 MCG tablet TAKE 1 TABLET BY MOUTH DAILY 90 tablet 1    buPROPion (WELLBUTRIN SR) 200 MG extended release tablet TAKE 1 TABLET BY MOUTH TWICE DAILY 60 tablet 1     No current facility-administered medications for this visit. Return in about 3 months (around 8/2/2022). An After Visit Summary was printed and given to the patient. Documentation was done using voice recognition dragon software. Every effort was made to ensure accuracy; however, inadvertent  Unintentional computerized transcription errors may be present.

## 2022-05-05 RX ORDER — BUPROPION HYDROCHLORIDE 200 MG/1
TABLET, EXTENDED RELEASE ORAL
Qty: 60 TABLET | Refills: 1 | Status: SHIPPED | OUTPATIENT
Start: 2022-05-05 | End: 2022-08-18

## 2022-07-20 ENCOUNTER — TELEPHONE (OUTPATIENT)
Dept: INTERNAL MEDICINE CLINIC | Age: 32
End: 2022-07-20

## 2022-08-18 ENCOUNTER — TELEPHONE (OUTPATIENT)
Dept: INTERNAL MEDICINE CLINIC | Age: 32
End: 2022-08-18

## 2022-08-18 DIAGNOSIS — E78.5 DYSLIPIDEMIA: ICD-10-CM

## 2022-08-18 DIAGNOSIS — K76.0 HEPATIC STEATOSIS: ICD-10-CM

## 2022-08-18 RX ORDER — ATORVASTATIN CALCIUM 20 MG/1
20 TABLET, FILM COATED ORAL DAILY
Qty: 30 TABLET | Refills: 3 | Status: SHIPPED | OUTPATIENT
Start: 2022-08-18 | End: 2022-09-15

## 2022-08-18 RX ORDER — BUPROPION HYDROCHLORIDE 200 MG/1
TABLET, EXTENDED RELEASE ORAL
Qty: 60 TABLET | Refills: 2 | Status: SHIPPED | OUTPATIENT
Start: 2022-08-18 | End: 2022-09-15

## 2022-08-18 NOTE — TELEPHONE ENCOUNTER
LVM for patient to call the office to schedule his follow up visit.  30 day supply of medications sent to pharmacy today-patient will need an office visit for further refills per Dr. Manda Molina.

## 2022-09-15 DIAGNOSIS — E78.5 DYSLIPIDEMIA: ICD-10-CM

## 2022-09-15 DIAGNOSIS — K76.0 HEPATIC STEATOSIS: ICD-10-CM

## 2022-09-15 RX ORDER — ATORVASTATIN CALCIUM 20 MG/1
20 TABLET, FILM COATED ORAL DAILY
Qty: 30 TABLET | Refills: 0 | Status: SHIPPED | OUTPATIENT
Start: 2022-09-15

## 2022-09-15 RX ORDER — BUPROPION HYDROCHLORIDE 200 MG/1
TABLET, EXTENDED RELEASE ORAL
Qty: 60 TABLET | Refills: 0 | Status: SHIPPED | OUTPATIENT
Start: 2022-09-15

## 2022-09-15 NOTE — TELEPHONE ENCOUNTER
Medication:   Requested Prescriptions     Pending Prescriptions Disp Refills    buPROPion (WELLBUTRIN SR) 200 MG extended release tablet [Pharmacy Med Name: BUPROPION SR 200MG TABLETS (12 HR)] 60 tablet 2     Sig: TAKE 1 TABLET BY MOUTH TWICE DAILY    atorvastatin (LIPITOR) 20 MG tablet [Pharmacy Med Name: ATORVASTATIN 20MG TABLETS] 30 tablet 3     Sig: TAKE 1 TABLET BY MOUTH DAILY       Last Filled:      Patient Phone Number: [phone] (home)     Last appt: 1/7/2022   Next appt: Visit date not found

## 2022-09-15 NOTE — TELEPHONE ENCOUNTER
30 day supply given to pt on 8/18/22 - no further refills per Dr. Gabriella Parks until office visit made. Pharmacy was called to cancel refills that were sent over electronically today.

## 2023-08-03 ENCOUNTER — TELEPHONE (OUTPATIENT)
Dept: INTERNAL MEDICINE CLINIC | Age: 33
End: 2023-08-03

## 2023-08-03 NOTE — TELEPHONE ENCOUNTER
LVM for pt to call office to clarify what insurance he has. Pt had [place] and according to his documents uploaded it looks like he has [employer]. If pt returns call please inform of [facility] being out of network with [place] [employer] (if that is the insurance he currently has).

## 2023-09-19 ENCOUNTER — OFFICE VISIT (OUTPATIENT)
Dept: FAMILY MEDICINE CLINIC | Age: 33
End: 2023-09-19
Payer: COMMERCIAL

## 2023-09-19 VITALS
DIASTOLIC BLOOD PRESSURE: 86 MMHG | HEIGHT: 69 IN | WEIGHT: 265 LBS | BODY MASS INDEX: 39.25 KG/M2 | RESPIRATION RATE: 16 BRPM | SYSTOLIC BLOOD PRESSURE: 124 MMHG | HEART RATE: 70 BPM | OXYGEN SATURATION: 98 %

## 2023-09-19 DIAGNOSIS — Z00.00 ANNUAL PHYSICAL EXAM: Primary | ICD-10-CM

## 2023-09-19 DIAGNOSIS — Z13.21 ENCOUNTER FOR VITAMIN DEFICIENCY SCREENING: ICD-10-CM

## 2023-09-19 DIAGNOSIS — E78.2 MIXED HYPERLIPIDEMIA: ICD-10-CM

## 2023-09-19 DIAGNOSIS — E03.9 ACQUIRED HYPOTHYROIDISM: Chronic | ICD-10-CM

## 2023-09-19 DIAGNOSIS — Z11.4 ENCOUNTER FOR SCREENING FOR HIV: ICD-10-CM

## 2023-09-19 DIAGNOSIS — F41.1 GAD (GENERALIZED ANXIETY DISORDER): Chronic | ICD-10-CM

## 2023-09-19 DIAGNOSIS — E66.01 CLASS 2 SEVERE OBESITY DUE TO EXCESS CALORIES WITH SERIOUS COMORBIDITY AND BODY MASS INDEX (BMI) OF 39.0 TO 39.9 IN ADULT (HCC): ICD-10-CM

## 2023-09-19 DIAGNOSIS — Z11.59 NEED FOR HEPATITIS C SCREENING TEST: ICD-10-CM

## 2023-09-19 DIAGNOSIS — G47.33 OBSTRUCTIVE SLEEP APNEA SYNDROME: ICD-10-CM

## 2023-09-19 DIAGNOSIS — F33.1 MODERATE EPISODE OF RECURRENT MAJOR DEPRESSIVE DISORDER (HCC): Chronic | ICD-10-CM

## 2023-09-19 PROCEDURE — G8427 DOCREV CUR MEDS BY ELIG CLIN: HCPCS | Performed by: NURSE PRACTITIONER

## 2023-09-19 PROCEDURE — G8417 CALC BMI ABV UP PARAM F/U: HCPCS | Performed by: NURSE PRACTITIONER

## 2023-09-19 PROCEDURE — 4004F PT TOBACCO SCREEN RCVD TLK: CPT | Performed by: NURSE PRACTITIONER

## 2023-09-19 PROCEDURE — 99385 PREV VISIT NEW AGE 18-39: CPT | Performed by: NURSE PRACTITIONER

## 2023-09-19 PROCEDURE — 99203 OFFICE O/P NEW LOW 30 MIN: CPT | Performed by: NURSE PRACTITIONER

## 2023-09-19 RX ORDER — BUPROPION HYDROCHLORIDE 150 MG/1
150 TABLET ORAL EVERY MORNING
Qty: 90 TABLET | Refills: 1 | Status: SHIPPED | OUTPATIENT
Start: 2023-09-19

## 2023-09-19 SDOH — HEALTH STABILITY: PHYSICAL HEALTH: ON AVERAGE, HOW MANY DAYS PER WEEK DO YOU ENGAGE IN MODERATE TO STRENUOUS EXERCISE (LIKE A BRISK WALK)?: 0 DAYS

## 2023-09-19 SDOH — ECONOMIC STABILITY: FOOD INSECURITY: WITHIN THE PAST 12 MONTHS, THE FOOD YOU BOUGHT JUST DIDN'T LAST AND YOU DIDN'T HAVE MONEY TO GET MORE.: NEVER TRUE

## 2023-09-19 SDOH — ECONOMIC STABILITY: HOUSING INSECURITY
IN THE LAST 12 MONTHS, WAS THERE A TIME WHEN YOU DID NOT HAVE A STEADY PLACE TO SLEEP OR SLEPT IN A SHELTER (INCLUDING NOW)?: NO

## 2023-09-19 SDOH — ECONOMIC STABILITY: INCOME INSECURITY: HOW HARD IS IT FOR YOU TO PAY FOR THE VERY BASICS LIKE FOOD, HOUSING, MEDICAL CARE, AND HEATING?: NOT VERY HARD

## 2023-09-19 SDOH — ECONOMIC STABILITY: FOOD INSECURITY: WITHIN THE PAST 12 MONTHS, YOU WORRIED THAT YOUR FOOD WOULD RUN OUT BEFORE YOU GOT MONEY TO BUY MORE.: NEVER TRUE

## 2023-09-19 ASSESSMENT — PATIENT HEALTH QUESTIONNAIRE - PHQ9
4. FEELING TIRED OR HAVING LITTLE ENERGY: 2
SUM OF ALL RESPONSES TO PHQ QUESTIONS 1-9: 16
5. POOR APPETITE OR OVEREATING: 0
7. TROUBLE CONCENTRATING ON THINGS, SUCH AS READING THE NEWSPAPER OR WATCHING TELEVISION: 3
SUM OF ALL RESPONSES TO PHQ9 QUESTIONS 1 & 2: 2
10. IF YOU CHECKED OFF ANY PROBLEMS, HOW DIFFICULT HAVE THESE PROBLEMS MADE IT FOR YOU TO DO YOUR WORK, TAKE CARE OF THINGS AT HOME, OR GET ALONG WITH OTHER PEOPLE: 1
2. FEELING DOWN, DEPRESSED OR HOPELESS: 1
SUM OF ALL RESPONSES TO PHQ QUESTIONS 1-9: 16
6. FEELING BAD ABOUT YOURSELF - OR THAT YOU ARE A FAILURE OR HAVE LET YOURSELF OR YOUR FAMILY DOWN: 3
8. MOVING OR SPEAKING SO SLOWLY THAT OTHER PEOPLE COULD HAVE NOTICED. OR THE OPPOSITE, BEING SO FIGETY OR RESTLESS THAT YOU HAVE BEEN MOVING AROUND A LOT MORE THAN USUAL: 3
3. TROUBLE FALLING OR STAYING ASLEEP: 3
SUM OF ALL RESPONSES TO PHQ QUESTIONS 1-9: 16
SUM OF ALL RESPONSES TO PHQ QUESTIONS 1-9: 16
9. THOUGHTS THAT YOU WOULD BE BETTER OFF DEAD, OR OF HURTING YOURSELF: 0
1. LITTLE INTEREST OR PLEASURE IN DOING THINGS: 1

## 2023-09-19 ASSESSMENT — ENCOUNTER SYMPTOMS
CONSTIPATION: 0
SINUS PAIN: 0
WHEEZING: 0
CHEST TIGHTNESS: 0
ABDOMINAL PAIN: 0
SINUS PRESSURE: 0
COUGH: 0
SORE THROAT: 0
BLOOD IN STOOL: 0
EYES NEGATIVE: 1
SHORTNESS OF BREATH: 0
VOMITING: 0
NAUSEA: 0
DIARRHEA: 0

## 2023-09-19 NOTE — PROGRESS NOTES
2023    Roxann Mcgrath (:  1990) is a 29 y.o. male, here for evaluation of the following medical concerns:    Chief Complaint   Patient presents with    New Patient     Establish new pcp     Past medical, surgical, family and social history, as well as current medications and allergies, were reviewed and updated with the patient. Patient is here for annual physical.    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 Was taking Wellbutrin and had good results from this. He attends [employer] and has had to drop the last two semesters. Obesity:  has been losing weight since . Is now at 265lbs. Not really making much of a change in his dietary choices. ARIEL:  currently not wearing CPAP. Having trouble with finding correct mask. Hypothyroidism: Recent symptoms: fatigue, weight gain, constipation, diarrhea, and anxiety. He denies cold intolerance, heat intolerance, hair loss, dry skin, edema, palpitations, and dysphagia. Patient is not taking his medication at this time. Varicella:  had as a child  Lab Results   Component Value Date/Time    TSHREFLEX 3.66 2022 02:41 PM    TSHREFLEX 5.48 10/29/2021 02:28 PM    TSHREFLEX 7.50 2021 09:46 AM     No results found for: \"TSH\"    Review of Systems   Constitutional:  Negative for chills, diaphoresis, fatigue and fever. HENT:  Negative for congestion, ear discharge, ear pain, sinus pressure, sinus pain and sore throat. Eyes: Negative.     Respiratory:  Negative for cough, chest

## 2023-09-20 DIAGNOSIS — E66.01 CLASS 2 SEVERE OBESITY DUE TO EXCESS CALORIES WITH SERIOUS COMORBIDITY AND BODY MASS INDEX (BMI) OF 39.0 TO 39.9 IN ADULT (HCC): ICD-10-CM

## 2023-09-20 DIAGNOSIS — Z13.21 ENCOUNTER FOR VITAMIN DEFICIENCY SCREENING: ICD-10-CM

## 2023-09-20 DIAGNOSIS — Z11.4 ENCOUNTER FOR SCREENING FOR HIV: ICD-10-CM

## 2023-09-20 DIAGNOSIS — E78.2 MIXED HYPERLIPIDEMIA: ICD-10-CM

## 2023-09-20 DIAGNOSIS — Z11.59 NEED FOR HEPATITIS C SCREENING TEST: ICD-10-CM

## 2023-09-20 DIAGNOSIS — E03.9 ACQUIRED HYPOTHYROIDISM: Chronic | ICD-10-CM

## 2023-09-20 LAB
25(OH)D3 SERPL-MCNC: 23.5 NG/ML
BASOPHILS # BLD: 0 K/UL (ref 0–0.2)
BASOPHILS NFR BLD: 0.3 %
DEPRECATED RDW RBC AUTO: 14.1 % (ref 12.4–15.4)
EOSINOPHIL # BLD: 0.3 K/UL (ref 0–0.6)
EOSINOPHIL NFR BLD: 3.7 %
HCT VFR BLD AUTO: 43.9 % (ref 40.5–52.5)
HCV AB SERPL QL IA: NORMAL
HGB BLD-MCNC: 14.8 G/DL (ref 13.5–17.5)
LYMPHOCYTES # BLD: 2.6 K/UL (ref 1–5.1)
LYMPHOCYTES NFR BLD: 31.1 %
MCH RBC QN AUTO: 29.4 PG (ref 26–34)
MCHC RBC AUTO-ENTMCNC: 33.7 G/DL (ref 31–36)
MCV RBC AUTO: 87.2 FL (ref 80–100)
MONOCYTES # BLD: 0.5 K/UL (ref 0–1.3)
MONOCYTES NFR BLD: 5.5 %
NEUTROPHILS # BLD: 5 K/UL (ref 1.7–7.7)
NEUTROPHILS NFR BLD: 59.4 %
PLATELET # BLD AUTO: 251 K/UL (ref 135–450)
PMV BLD AUTO: 9.9 FL (ref 5–10.5)
RBC # BLD AUTO: 5.03 M/UL (ref 4.2–5.9)
TSH SERPL DL<=0.005 MIU/L-ACNC: 6.63 UIU/ML (ref 0.27–4.2)
WBC # BLD AUTO: 8.4 K/UL (ref 4–11)

## 2023-09-21 LAB
ALBUMIN SERPL-MCNC: 4.8 G/DL (ref 3.4–5)
ALBUMIN/GLOB SERPL: 1.9 {RATIO} (ref 1.1–2.2)
ALP SERPL-CCNC: 47 U/L (ref 40–129)
ALT SERPL-CCNC: 24 U/L (ref 10–40)
ANION GAP SERPL CALCULATED.3IONS-SCNC: 14 MMOL/L (ref 3–16)
AST SERPL-CCNC: 23 U/L (ref 15–37)
BILIRUB SERPL-MCNC: 0.4 MG/DL (ref 0–1)
BUN SERPL-MCNC: 19 MG/DL (ref 7–20)
CALCIUM SERPL-MCNC: 9.1 MG/DL (ref 8.3–10.6)
CHLORIDE SERPL-SCNC: 103 MMOL/L (ref 99–110)
CHOLEST SERPL-MCNC: 186 MG/DL (ref 0–199)
CO2 SERPL-SCNC: 24 MMOL/L (ref 21–32)
CREAT SERPL-MCNC: 0.9 MG/DL (ref 0.9–1.3)
EST. AVERAGE GLUCOSE BLD GHB EST-MCNC: 96.8 MG/DL
GFR SERPLBLD CREATININE-BSD FMLA CKD-EPI: >60 ML/MIN/{1.73_M2}
GLUCOSE P FAST SERPL-MCNC: 95 MG/DL (ref 70–99)
HBA1C MFR BLD: 5 %
HDLC SERPL-MCNC: 38 MG/DL (ref 40–60)
HIV 1+2 AB+HIV1 P24 AG SERPL QL IA: NORMAL
HIV 2 AB SERPL QL IA: NORMAL
HIV1 AB SERPL QL IA: NORMAL
HIV1 P24 AG SERPL QL IA: NORMAL
LDL CHOLESTEROL CALCULATED: 122 MG/DL
POTASSIUM SERPL-SCNC: 4.8 MMOL/L (ref 3.5–5.1)
PROT SERPL-MCNC: 7.3 G/DL (ref 6.4–8.2)
SODIUM SERPL-SCNC: 141 MMOL/L (ref 136–145)
T4 FREE SERPL-MCNC: 1 NG/DL (ref 0.9–1.8)
TRIGL SERPL-MCNC: 132 MG/DL (ref 0–150)
VLDLC SERPL CALC-MCNC: 26 MG/DL

## 2024-04-01 DIAGNOSIS — F41.1 GAD (GENERALIZED ANXIETY DISORDER): Chronic | ICD-10-CM

## 2024-04-01 DIAGNOSIS — F33.1 MODERATE EPISODE OF RECURRENT MAJOR DEPRESSIVE DISORDER (HCC): Chronic | ICD-10-CM

## 2024-04-01 RX ORDER — BUPROPION HYDROCHLORIDE 150 MG/1
150 TABLET ORAL EVERY MORNING
Qty: 90 TABLET | Refills: 1 | Status: SHIPPED | OUTPATIENT
Start: 2024-04-01

## 2024-04-01 NOTE — TELEPHONE ENCOUNTER
Medication:   Requested Prescriptions     Pending Prescriptions Disp Refills    buPROPion (WELLBUTRIN XL) 150 MG extended release tablet [Pharmacy Med Name: BUPROPION XL 150MG TABLETS (24 H)] 90 tablet 1     Sig: TAKE 1 TABLET BY MOUTH EVERY MORNING        Last Filled:  9/19/2023, 90, 1    Patient Phone Number: [phone] (home)     Last appt: 9/19/2023   Next appt: 4/4/2024    Last OARRS:        No data to display

## 2024-04-03 ASSESSMENT — PATIENT HEALTH QUESTIONNAIRE - PHQ9
4. FEELING TIRED OR HAVING LITTLE ENERGY: SEVERAL DAYS
2. FEELING DOWN, DEPRESSED OR HOPELESS: SEVERAL DAYS
2. FEELING DOWN, DEPRESSED OR HOPELESS: SEVERAL DAYS
SUM OF ALL RESPONSES TO PHQ QUESTIONS 1-9: 8
4. FEELING TIRED OR HAVING LITTLE ENERGY: SEVERAL DAYS
8. MOVING OR SPEAKING SO SLOWLY THAT OTHER PEOPLE COULD HAVE NOTICED. OR THE OPPOSITE, BEING SO FIGETY OR RESTLESS THAT YOU HAVE BEEN MOVING AROUND A LOT MORE THAN USUAL: SEVERAL DAYS
1. LITTLE INTEREST OR PLEASURE IN DOING THINGS: SEVERAL DAYS
SUM OF ALL RESPONSES TO PHQ QUESTIONS 1-9: 8
9. THOUGHTS THAT YOU WOULD BE BETTER OFF DEAD, OR OF HURTING YOURSELF: NOT AT ALL
3. TROUBLE FALLING OR STAYING ASLEEP: SEVERAL DAYS
SUM OF ALL RESPONSES TO PHQ9 QUESTIONS 1 & 2: 2
7. TROUBLE CONCENTRATING ON THINGS, SUCH AS READING THE NEWSPAPER OR WATCHING TELEVISION: SEVERAL DAYS
10. IF YOU CHECKED OFF ANY PROBLEMS, HOW DIFFICULT HAVE THESE PROBLEMS MADE IT FOR YOU TO DO YOUR WORK, TAKE CARE OF THINGS AT HOME, OR GET ALONG WITH OTHER PEOPLE: SOMEWHAT DIFFICULT
10. IF YOU CHECKED OFF ANY PROBLEMS, HOW DIFFICULT HAVE THESE PROBLEMS MADE IT FOR YOU TO DO YOUR WORK, TAKE CARE OF THINGS AT HOME, OR GET ALONG WITH OTHER PEOPLE: SOMEWHAT DIFFICULT
6. FEELING BAD ABOUT YOURSELF - OR THAT YOU ARE A FAILURE OR HAVE LET YOURSELF OR YOUR FAMILY DOWN: SEVERAL DAYS
1. LITTLE INTEREST OR PLEASURE IN DOING THINGS: SEVERAL DAYS
9. THOUGHTS THAT YOU WOULD BE BETTER OFF DEAD, OR OF HURTING YOURSELF: NOT AT ALL
SUM OF ALL RESPONSES TO PHQ QUESTIONS 1-9: 8
8. MOVING OR SPEAKING SO SLOWLY THAT OTHER PEOPLE COULD HAVE NOTICED. OR THE OPPOSITE - BEING SO FIDGETY OR RESTLESS THAT YOU HAVE BEEN MOVING AROUND A LOT MORE THAN USUAL: SEVERAL DAYS
SUM OF ALL RESPONSES TO PHQ QUESTIONS 1-9: 8
SUM OF ALL RESPONSES TO PHQ QUESTIONS 1-9: 8
7. TROUBLE CONCENTRATING ON THINGS, SUCH AS READING THE NEWSPAPER OR WATCHING TELEVISION: SEVERAL DAYS
3. TROUBLE FALLING OR STAYING ASLEEP: SEVERAL DAYS
5. POOR APPETITE OR OVEREATING: SEVERAL DAYS
5. POOR APPETITE OR OVEREATING: SEVERAL DAYS
6. FEELING BAD ABOUT YOURSELF - OR THAT YOU ARE A FAILURE OR HAVE LET YOURSELF OR YOUR FAMILY DOWN: SEVERAL DAYS

## 2024-04-04 ENCOUNTER — OFFICE VISIT (OUTPATIENT)
Dept: FAMILY MEDICINE CLINIC | Age: 34
End: 2024-04-04
Payer: COMMERCIAL

## 2024-04-04 VITALS
OXYGEN SATURATION: 96 % | HEART RATE: 100 BPM | SYSTOLIC BLOOD PRESSURE: 120 MMHG | RESPIRATION RATE: 16 BRPM | DIASTOLIC BLOOD PRESSURE: 86 MMHG | BODY MASS INDEX: 40.29 KG/M2 | HEIGHT: 69 IN | WEIGHT: 272 LBS

## 2024-04-04 DIAGNOSIS — F33.1 MODERATE EPISODE OF RECURRENT MAJOR DEPRESSIVE DISORDER (HCC): Primary | ICD-10-CM

## 2024-04-04 DIAGNOSIS — R46.89 AGGRESSIVE BEHAVIOR: ICD-10-CM

## 2024-04-04 PROCEDURE — G8427 DOCREV CUR MEDS BY ELIG CLIN: HCPCS | Performed by: NURSE PRACTITIONER

## 2024-04-04 PROCEDURE — 4004F PT TOBACCO SCREEN RCVD TLK: CPT | Performed by: NURSE PRACTITIONER

## 2024-04-04 PROCEDURE — 99214 OFFICE O/P EST MOD 30 MIN: CPT | Performed by: NURSE PRACTITIONER

## 2024-04-04 PROCEDURE — G8417 CALC BMI ABV UP PARAM F/U: HCPCS | Performed by: NURSE PRACTITIONER

## 2024-04-04 RX ORDER — MULTIVIT-MIN/IRON/FOLIC ACID/K 18-600-40
CAPSULE ORAL
COMMUNITY

## 2024-04-04 ASSESSMENT — PATIENT HEALTH QUESTIONNAIRE - PHQ9
5. POOR APPETITE OR OVEREATING: SEVERAL DAYS
9. THOUGHTS THAT YOU WOULD BE BETTER OFF DEAD, OR OF HURTING YOURSELF: NOT AT ALL
2. FEELING DOWN, DEPRESSED OR HOPELESS: SEVERAL DAYS
SUM OF ALL RESPONSES TO PHQ QUESTIONS 1-9: 8
3. TROUBLE FALLING OR STAYING ASLEEP: SEVERAL DAYS
8. MOVING OR SPEAKING SO SLOWLY THAT OTHER PEOPLE COULD HAVE NOTICED. OR THE OPPOSITE, BEING SO FIGETY OR RESTLESS THAT YOU HAVE BEEN MOVING AROUND A LOT MORE THAN USUAL: SEVERAL DAYS
SUM OF ALL RESPONSES TO PHQ9 QUESTIONS 1 & 2: 2
SUM OF ALL RESPONSES TO PHQ QUESTIONS 1-9: 8
4. FEELING TIRED OR HAVING LITTLE ENERGY: SEVERAL DAYS
10. IF YOU CHECKED OFF ANY PROBLEMS, HOW DIFFICULT HAVE THESE PROBLEMS MADE IT FOR YOU TO DO YOUR WORK, TAKE CARE OF THINGS AT HOME, OR GET ALONG WITH OTHER PEOPLE: SOMEWHAT DIFFICULT
SUM OF ALL RESPONSES TO PHQ QUESTIONS 1-9: 8
7. TROUBLE CONCENTRATING ON THINGS, SUCH AS READING THE NEWSPAPER OR WATCHING TELEVISION: SEVERAL DAYS
1. LITTLE INTEREST OR PLEASURE IN DOING THINGS: SEVERAL DAYS
6. FEELING BAD ABOUT YOURSELF - OR THAT YOU ARE A FAILURE OR HAVE LET YOURSELF OR YOUR FAMILY DOWN: SEVERAL DAYS
SUM OF ALL RESPONSES TO PHQ QUESTIONS 1-9: 8

## 2024-04-04 ASSESSMENT — ENCOUNTER SYMPTOMS
GASTROINTESTINAL NEGATIVE: 1
SHORTNESS OF BREATH: 0
COUGH: 0
CHEST TIGHTNESS: 0
WHEEZING: 0

## 2024-04-04 NOTE — PROGRESS NOTES
of recurrent major depressive disorder (HCC)  Unable to fully assess patient due to behavior, patient was asked to leave  Patient stood in room towering over provider cussing and yelling    2. Aggressive behavior  Failure to maintain therapeutic relationship    No follow-ups on file.

## 2024-11-04 ENCOUNTER — OFFICE VISIT (OUTPATIENT)
Dept: FAMILY MEDICINE CLINIC | Age: 34
End: 2024-11-04

## 2024-11-04 VITALS
RESPIRATION RATE: 22 BRPM | TEMPERATURE: 98 F | HEART RATE: 90 BPM | DIASTOLIC BLOOD PRESSURE: 77 MMHG | BODY MASS INDEX: 41.2 KG/M2 | SYSTOLIC BLOOD PRESSURE: 118 MMHG | OXYGEN SATURATION: 97 % | WEIGHT: 279 LBS

## 2024-11-04 DIAGNOSIS — F41.1 GAD (GENERALIZED ANXIETY DISORDER): Chronic | ICD-10-CM

## 2024-11-04 DIAGNOSIS — E55.9 VITAMIN D DEFICIENCY: ICD-10-CM

## 2024-11-04 DIAGNOSIS — H61.23 BILATERAL IMPACTED CERUMEN: ICD-10-CM

## 2024-11-04 DIAGNOSIS — E03.9 ACQUIRED HYPOTHYROIDISM: Chronic | ICD-10-CM

## 2024-11-04 DIAGNOSIS — Z23 FLU VACCINE NEED: Primary | ICD-10-CM

## 2024-11-04 DIAGNOSIS — F33.1 MODERATE EPISODE OF RECURRENT MAJOR DEPRESSIVE DISORDER (HCC): Chronic | ICD-10-CM

## 2024-11-04 RX ORDER — BUPROPION HYDROCHLORIDE 150 MG/1
TABLET ORAL
Qty: 90 TABLET | Refills: 1 | Status: SHIPPED | OUTPATIENT
Start: 2024-11-04 | End: 2025-02-16

## 2024-11-04 SDOH — ECONOMIC STABILITY: FOOD INSECURITY: WITHIN THE PAST 12 MONTHS, THE FOOD YOU BOUGHT JUST DIDN'T LAST AND YOU DIDN'T HAVE MONEY TO GET MORE.: NEVER TRUE

## 2024-11-04 SDOH — ECONOMIC STABILITY: FOOD INSECURITY: WITHIN THE PAST 12 MONTHS, YOU WORRIED THAT YOUR FOOD WOULD RUN OUT BEFORE YOU GOT MONEY TO BUY MORE.: NEVER TRUE

## 2024-11-04 SDOH — ECONOMIC STABILITY: INCOME INSECURITY: HOW HARD IS IT FOR YOU TO PAY FOR THE VERY BASICS LIKE FOOD, HOUSING, MEDICAL CARE, AND HEATING?: NOT HARD AT ALL

## 2024-11-04 SDOH — HEALTH STABILITY: PHYSICAL HEALTH: ON AVERAGE, HOW MANY DAYS PER WEEK DO YOU ENGAGE IN MODERATE TO STRENUOUS EXERCISE (LIKE A BRISK WALK)?: 0 DAYS

## 2024-11-04 SDOH — HEALTH STABILITY: PHYSICAL HEALTH: ON AVERAGE, HOW MANY MINUTES DO YOU ENGAGE IN EXERCISE AT THIS LEVEL?: 0 MIN

## 2024-11-04 NOTE — PROGRESS NOTES
Femi Lepe is a 34 y.o. year old male here for:  Chief Complaint: No chief complaint on file.        ASSESSMENT & PLAN:  1. Flu vaccine need  -     Influenza, FLUCELVAX Trivalent, (age 6 mo+) IM, Preservative Free, 0.5mL  2. Acquired hypothyroidism  -     TSH with Reflex; Future  3. Moderate episode of recurrent major depressive disorder (HCC)  -     buPROPion (WELLBUTRIN XL) 150 MG extended release tablet; Take 1 tablet by mouth every morning for 14 days, THEN 2 tablets every morning., Disp-90 tablet, R-1Normal  -     Ambulatory referral to Psychiatry  4. DILIA (generalized anxiety disorder)  -     Ambulatory referral to Psychiatry  5. Bilateral impacted cerumen  -     carbamide peroxide (DEBROX) 6.5 % otic solution; Place 5 drops into both ears 2 times daily, Disp-15 mL, R-0Normal  6. Vitamin D deficiency  -     Vitamin D 25 Hydroxy; Future    Pleasant 34-year-old male presenting for discussion and routine management/labs.  Will refill Wellbutrin as this was tolerated and he was compliant with this though will increase dose from 150 to 300 mg daily.  At some point there was initial concern for possible bipolar and potential need to avoid Wellbutrin, however, since patient was able to tolerate Wellbutrin in the past and other symptoms do not seem consistent with bipolar diagnosis we will continue this pending psychiatry evaluation.  Will also obtain routine lab work for thyroid and vitamin D deficiency as these can both have negative impacts on psychiatric health.  Will treat pending results.  Order placed for Debrox for the bilateral impacted cerumen and instructions given to patient.  Patient had flu vaccine in clinic today.      Return in about 6 weeks (around 12/16/2024) for Routine mood check.     Reviewed all pertinent previous records, including lab work and imaging. Encouraged patient to call back with any question/concerns.  Return/ED precautions discussed, all questions/concerns answered and patient

## 2024-11-05 DIAGNOSIS — E03.9 ACQUIRED HYPOTHYROIDISM: Primary | Chronic | ICD-10-CM

## 2024-11-05 LAB
25(OH)D3 SERPL-MCNC: 29.3 NG/ML
T4 FREE SERPL-MCNC: 0.9 NG/DL (ref 0.9–1.8)
TSH SERPL DL<=0.005 MIU/L-ACNC: 6.03 UIU/ML (ref 0.27–4.2)

## 2024-11-05 RX ORDER — LEVOTHYROXINE SODIUM 50 UG/1
50 TABLET ORAL DAILY
Qty: 90 TABLET | Refills: 1 | Status: SHIPPED | OUTPATIENT
Start: 2024-11-05

## 2024-12-11 NOTE — PROGRESS NOTES
Femi Lepe is a 34 y.o. year old male here for:  Chief Complaint:   Chief Complaint   Patient presents with    Other     6 wk f/u mood check     Medication Check         ASSESSMENT & PLAN:  1. Acquired hypothyroidism  -     TSH with Reflex; Future  2. Moderate episode of recurrent major depressive disorder (HCC)  Assessment & Plan:   Chronic, at goal (stable), continue current treatment plan      Return in about 3 months (around 3/16/2025) for Routine med check.     Reviewed all pertinent previous records, including lab work and imaging. Encouraged patient to call back with any question/concerns.  Return/ED precautions discussed, all questions/concerns answered and patient verbalized understanding/approval of treatment plan.   Gume Davey MD    Subjective:  HPI:  Patient is a pleasant 34 y.o. year-old male with history significant for hypothyroidism, anxiety/depression  presenting to [facility] for follow-up chronic conditions.    In terms of the Hypothyroidism, patient has been taking the Synthroid and tolerates without ADR.    In terms of mood symptoms, patient reports good benefit from the Wellbutrin and tolerates the higher dose without ADR.     No other concerns at this time    Past Medical History:   Diagnosis Date    Acquired hypothyroidism 05/02/2019    ADHD (attention deficit hyperactivity disorder)     DILIA (generalized anxiety disorder) 05/08/2019    Moderate episode of recurrent major depressive disorder (HCC) 05/08/2019    Morbid obesity, unspecified obesity type 12/03/2019    Sleep apnea      Social History     Tobacco Use    Smoking status: Every Day     Types: E-Cigarettes    Smokeless tobacco: Never    Tobacco comments:     vaping only   Vaping Use    Vaping status: Every Day    Substances: Always   Substance Use Topics    Alcohol use: Not Currently    Drug use: Yes     Types: Marijuana (Weed)     Comment: daily     Family History   Problem Relation Age of Onset    Substance Abuse Mother

## 2024-12-16 ENCOUNTER — OFFICE VISIT (OUTPATIENT)
Dept: FAMILY MEDICINE CLINIC | Age: 34
End: 2024-12-16
Payer: COMMERCIAL

## 2024-12-16 VITALS
OXYGEN SATURATION: 98 % | SYSTOLIC BLOOD PRESSURE: 120 MMHG | BODY MASS INDEX: 41.71 KG/M2 | DIASTOLIC BLOOD PRESSURE: 76 MMHG | WEIGHT: 281.6 LBS | HEART RATE: 79 BPM | HEIGHT: 69 IN

## 2024-12-16 DIAGNOSIS — E03.9 ACQUIRED HYPOTHYROIDISM: Primary | Chronic | ICD-10-CM

## 2024-12-16 DIAGNOSIS — E03.9 ACQUIRED HYPOTHYROIDISM: Chronic | ICD-10-CM

## 2024-12-16 DIAGNOSIS — F33.1 MODERATE EPISODE OF RECURRENT MAJOR DEPRESSIVE DISORDER (HCC): Chronic | ICD-10-CM

## 2024-12-16 LAB — TSH SERPL DL<=0.005 MIU/L-ACNC: 4.4 UIU/ML (ref 0.27–4.2)

## 2024-12-16 PROCEDURE — G8484 FLU IMMUNIZE NO ADMIN: HCPCS | Performed by: STUDENT IN AN ORGANIZED HEALTH CARE EDUCATION/TRAINING PROGRAM

## 2024-12-16 PROCEDURE — 4004F PT TOBACCO SCREEN RCVD TLK: CPT | Performed by: STUDENT IN AN ORGANIZED HEALTH CARE EDUCATION/TRAINING PROGRAM

## 2024-12-16 PROCEDURE — 99213 OFFICE O/P EST LOW 20 MIN: CPT | Performed by: STUDENT IN AN ORGANIZED HEALTH CARE EDUCATION/TRAINING PROGRAM

## 2024-12-16 PROCEDURE — G8427 DOCREV CUR MEDS BY ELIG CLIN: HCPCS | Performed by: STUDENT IN AN ORGANIZED HEALTH CARE EDUCATION/TRAINING PROGRAM

## 2024-12-16 PROCEDURE — G8417 CALC BMI ABV UP PARAM F/U: HCPCS | Performed by: STUDENT IN AN ORGANIZED HEALTH CARE EDUCATION/TRAINING PROGRAM

## 2024-12-17 LAB — T4 FREE SERPL-MCNC: 1.2 NG/DL (ref 0.9–1.8)

## 2025-02-07 ENCOUNTER — NURSE ONLY (OUTPATIENT)
Dept: FAMILY MEDICINE CLINIC | Age: 35
End: 2025-02-07
Payer: COMMERCIAL

## 2025-02-07 DIAGNOSIS — J02.9 SORE THROAT: Primary | ICD-10-CM

## 2025-02-07 LAB — S PYO AG THROAT QL: NORMAL

## 2025-02-07 PROCEDURE — 87880 STREP A ASSAY W/OPTIC: CPT | Performed by: STUDENT IN AN ORGANIZED HEALTH CARE EDUCATION/TRAINING PROGRAM

## 2025-02-13 DIAGNOSIS — F33.1 MODERATE EPISODE OF RECURRENT MAJOR DEPRESSIVE DISORDER (HCC): Chronic | ICD-10-CM

## 2025-02-13 RX ORDER — BUPROPION HYDROCHLORIDE 150 MG/1
TABLET ORAL
Qty: 90 TABLET | Refills: 1 | Status: SHIPPED | OUTPATIENT
Start: 2025-02-13

## 2025-02-13 NOTE — TELEPHONE ENCOUNTER
Medication:   Requested Prescriptions     Pending Prescriptions Disp Refills    buPROPion (WELLBUTRIN XL) 150 MG extended release tablet [Pharmacy Med Name: BUPROPION XL 150MG TABLETS (24 H)] 90 tablet 1     Sig: TAKE 1 TABLET BY MOUTH EVERY MORNING FOR 14 DAYS THEN TAKE 2 TABLETS BY MOUTH EVERY MORNING        Last Filled:  11/04/2024 90 tabs 1 refill     Patient Phone Number: [phone] (home)     Last appt: 12/16/2024   Next appt: 3/17/2025    Last OARRS:        No data to display

## 2025-03-16 NOTE — PROGRESS NOTES
generated completely or in part utilizing Dragon dictation speech recognition software.  Occasionally, words are mistranscribed and despite editing, the text may contain inaccuracies due to incorrect word recognition.  If further clarification is needed please contact the office at [phone].

## 2025-03-17 ENCOUNTER — OFFICE VISIT (OUTPATIENT)
Dept: FAMILY MEDICINE CLINIC | Age: 35
End: 2025-03-17
Payer: COMMERCIAL

## 2025-03-17 VITALS
WEIGHT: 282 LBS | OXYGEN SATURATION: 97 % | BODY MASS INDEX: 41.64 KG/M2 | DIASTOLIC BLOOD PRESSURE: 86 MMHG | HEART RATE: 99 BPM | SYSTOLIC BLOOD PRESSURE: 137 MMHG

## 2025-03-17 DIAGNOSIS — F33.1 MODERATE EPISODE OF RECURRENT MAJOR DEPRESSIVE DISORDER (HCC): Chronic | ICD-10-CM

## 2025-03-17 DIAGNOSIS — F41.1 GAD (GENERALIZED ANXIETY DISORDER): Chronic | ICD-10-CM

## 2025-03-17 DIAGNOSIS — E03.9 ACQUIRED HYPOTHYROIDISM: Primary | Chronic | ICD-10-CM

## 2025-03-17 DIAGNOSIS — E55.9 VITAMIN D DEFICIENCY: ICD-10-CM

## 2025-03-17 PROBLEM — R74.8 ELEVATED LIVER ENZYMES: Status: RESOLVED | Noted: 2021-09-20 | Resolved: 2025-03-17

## 2025-03-17 PROCEDURE — 99214 OFFICE O/P EST MOD 30 MIN: CPT | Performed by: STUDENT IN AN ORGANIZED HEALTH CARE EDUCATION/TRAINING PROGRAM

## 2025-03-17 PROCEDURE — G8427 DOCREV CUR MEDS BY ELIG CLIN: HCPCS | Performed by: STUDENT IN AN ORGANIZED HEALTH CARE EDUCATION/TRAINING PROGRAM

## 2025-03-17 PROCEDURE — G8417 CALC BMI ABV UP PARAM F/U: HCPCS | Performed by: STUDENT IN AN ORGANIZED HEALTH CARE EDUCATION/TRAINING PROGRAM

## 2025-03-17 PROCEDURE — 4004F PT TOBACCO SCREEN RCVD TLK: CPT | Performed by: STUDENT IN AN ORGANIZED HEALTH CARE EDUCATION/TRAINING PROGRAM

## 2025-03-17 RX ORDER — MULTIVIT-MIN/IRON/FOLIC ACID/K 18-600-40
4000 CAPSULE ORAL
Status: SHIPPED | COMMUNITY
Start: 2025-03-17

## 2025-03-17 RX ORDER — LEVOTHYROXINE SODIUM 75 UG/1
75 TABLET ORAL DAILY
Qty: 30 TABLET | Refills: 2 | Status: SHIPPED | OUTPATIENT
Start: 2025-03-17 | End: 2025-06-15

## 2025-03-17 SDOH — ECONOMIC STABILITY: FOOD INSECURITY: WITHIN THE PAST 12 MONTHS, THE FOOD YOU BOUGHT JUST DIDN'T LAST AND YOU DIDN'T HAVE MONEY TO GET MORE.: NEVER TRUE

## 2025-03-17 SDOH — ECONOMIC STABILITY: FOOD INSECURITY: WITHIN THE PAST 12 MONTHS, YOU WORRIED THAT YOUR FOOD WOULD RUN OUT BEFORE YOU GOT MONEY TO BUY MORE.: NEVER TRUE

## 2025-03-17 SDOH — ECONOMIC STABILITY: INCOME INSECURITY: IN THE LAST 12 MONTHS, WAS THERE A TIME WHEN YOU WERE NOT ABLE TO PAY THE MORTGAGE OR RENT ON TIME?: NO

## 2025-03-17 ASSESSMENT — PATIENT HEALTH QUESTIONNAIRE - PHQ9
2. FEELING DOWN, DEPRESSED OR HOPELESS: SEVERAL DAYS
1. LITTLE INTEREST OR PLEASURE IN DOING THINGS: SEVERAL DAYS
6. FEELING BAD ABOUT YOURSELF - OR THAT YOU ARE A FAILURE OR HAVE LET YOURSELF OR YOUR FAMILY DOWN: SEVERAL DAYS
3. TROUBLE FALLING OR STAYING ASLEEP: MORE THAN HALF THE DAYS
4. FEELING TIRED OR HAVING LITTLE ENERGY: MORE THAN HALF THE DAYS
5. POOR APPETITE OR OVEREATING: SEVERAL DAYS
SUM OF ALL RESPONSES TO PHQ QUESTIONS 1-9: 9
4. FEELING TIRED OR HAVING LITTLE ENERGY: MORE THAN HALF THE DAYS
7. TROUBLE CONCENTRATING ON THINGS, SUCH AS READING THE NEWSPAPER OR WATCHING TELEVISION: SEVERAL DAYS
10. IF YOU CHECKED OFF ANY PROBLEMS, HOW DIFFICULT HAVE THESE PROBLEMS MADE IT FOR YOU TO DO YOUR WORK, TAKE CARE OF THINGS AT HOME, OR GET ALONG WITH OTHER PEOPLE: SOMEWHAT DIFFICULT
SUM OF ALL RESPONSES TO PHQ QUESTIONS 1-9: 9
8. MOVING OR SPEAKING SO SLOWLY THAT OTHER PEOPLE COULD HAVE NOTICED. OR THE OPPOSITE - BEING SO FIDGETY OR RESTLESS THAT YOU HAVE BEEN MOVING AROUND A LOT MORE THAN USUAL: NOT AT ALL
SUM OF ALL RESPONSES TO PHQ QUESTIONS 1-9: 9
3. TROUBLE FALLING OR STAYING ASLEEP: MORE THAN HALF THE DAYS
2. FEELING DOWN, DEPRESSED OR HOPELESS: SEVERAL DAYS
10. IF YOU CHECKED OFF ANY PROBLEMS, HOW DIFFICULT HAVE THESE PROBLEMS MADE IT FOR YOU TO DO YOUR WORK, TAKE CARE OF THINGS AT HOME, OR GET ALONG WITH OTHER PEOPLE: SOMEWHAT DIFFICULT
SUM OF ALL RESPONSES TO PHQ QUESTIONS 1-9: 9
8. MOVING OR SPEAKING SO SLOWLY THAT OTHER PEOPLE COULD HAVE NOTICED. OR THE OPPOSITE, BEING SO FIGETY OR RESTLESS THAT YOU HAVE BEEN MOVING AROUND A LOT MORE THAN USUAL: NOT AT ALL
1. LITTLE INTEREST OR PLEASURE IN DOING THINGS: SEVERAL DAYS
7. TROUBLE CONCENTRATING ON THINGS, SUCH AS READING THE NEWSPAPER OR WATCHING TELEVISION: SEVERAL DAYS
5. POOR APPETITE OR OVEREATING: SEVERAL DAYS
SUM OF ALL RESPONSES TO PHQ QUESTIONS 1-9: 9
9. THOUGHTS THAT YOU WOULD BE BETTER OFF DEAD, OR OF HURTING YOURSELF: NOT AT ALL
6. FEELING BAD ABOUT YOURSELF - OR THAT YOU ARE A FAILURE OR HAVE LET YOURSELF OR YOUR FAMILY DOWN: SEVERAL DAYS
9. THOUGHTS THAT YOU WOULD BE BETTER OFF DEAD, OR OF HURTING YOURSELF: NOT AT ALL

## 2025-04-28 ENCOUNTER — OFFICE VISIT (OUTPATIENT)
Dept: FAMILY MEDICINE CLINIC | Age: 35
End: 2025-04-28
Payer: COMMERCIAL

## 2025-04-28 VITALS
WEIGHT: 290 LBS | HEIGHT: 69 IN | OXYGEN SATURATION: 97 % | SYSTOLIC BLOOD PRESSURE: 134 MMHG | BODY MASS INDEX: 42.95 KG/M2 | RESPIRATION RATE: 20 BRPM | DIASTOLIC BLOOD PRESSURE: 76 MMHG | HEART RATE: 97 BPM

## 2025-04-28 DIAGNOSIS — R53.83 OTHER FATIGUE: ICD-10-CM

## 2025-04-28 DIAGNOSIS — F41.1 GAD (GENERALIZED ANXIETY DISORDER): Chronic | ICD-10-CM

## 2025-04-28 DIAGNOSIS — G47.33 OBSTRUCTIVE SLEEP APNEA SYNDROME: ICD-10-CM

## 2025-04-28 DIAGNOSIS — E03.9 ACQUIRED HYPOTHYROIDISM: Primary | Chronic | ICD-10-CM

## 2025-04-28 DIAGNOSIS — F33.1 MODERATE EPISODE OF RECURRENT MAJOR DEPRESSIVE DISORDER (HCC): Chronic | ICD-10-CM

## 2025-04-28 PROCEDURE — 4004F PT TOBACCO SCREEN RCVD TLK: CPT | Performed by: STUDENT IN AN ORGANIZED HEALTH CARE EDUCATION/TRAINING PROGRAM

## 2025-04-28 PROCEDURE — G8417 CALC BMI ABV UP PARAM F/U: HCPCS | Performed by: STUDENT IN AN ORGANIZED HEALTH CARE EDUCATION/TRAINING PROGRAM

## 2025-04-28 PROCEDURE — G8427 DOCREV CUR MEDS BY ELIG CLIN: HCPCS | Performed by: STUDENT IN AN ORGANIZED HEALTH CARE EDUCATION/TRAINING PROGRAM

## 2025-04-28 PROCEDURE — 99214 OFFICE O/P EST MOD 30 MIN: CPT | Performed by: STUDENT IN AN ORGANIZED HEALTH CARE EDUCATION/TRAINING PROGRAM

## 2025-04-28 ASSESSMENT — PATIENT HEALTH QUESTIONNAIRE - PHQ9
SUM OF ALL RESPONSES TO PHQ QUESTIONS 1-9: 14
9. THOUGHTS THAT YOU WOULD BE BETTER OFF DEAD, OR OF HURTING YOURSELF: NOT AT ALL
8. MOVING OR SPEAKING SO SLOWLY THAT OTHER PEOPLE COULD HAVE NOTICED. OR THE OPPOSITE, BEING SO FIGETY OR RESTLESS THAT YOU HAVE BEEN MOVING AROUND A LOT MORE THAN USUAL: NOT AT ALL
3. TROUBLE FALLING OR STAYING ASLEEP: MORE THAN HALF THE DAYS
SUM OF ALL RESPONSES TO PHQ QUESTIONS 1-9: 14
10. IF YOU CHECKED OFF ANY PROBLEMS, HOW DIFFICULT HAVE THESE PROBLEMS MADE IT FOR YOU TO DO YOUR WORK, TAKE CARE OF THINGS AT HOME, OR GET ALONG WITH OTHER PEOPLE: SOMEWHAT DIFFICULT
7. TROUBLE CONCENTRATING ON THINGS, SUCH AS READING THE NEWSPAPER OR WATCHING TELEVISION: NEARLY EVERY DAY
5. POOR APPETITE OR OVEREATING: MORE THAN HALF THE DAYS
SUM OF ALL RESPONSES TO PHQ QUESTIONS 1-9: 14
6. FEELING BAD ABOUT YOURSELF - OR THAT YOU ARE A FAILURE OR HAVE LET YOURSELF OR YOUR FAMILY DOWN: NEARLY EVERY DAY
SUM OF ALL RESPONSES TO PHQ QUESTIONS 1-9: 14
2. FEELING DOWN, DEPRESSED OR HOPELESS: SEVERAL DAYS
4. FEELING TIRED OR HAVING LITTLE ENERGY: MORE THAN HALF THE DAYS
1. LITTLE INTEREST OR PLEASURE IN DOING THINGS: SEVERAL DAYS

## 2025-04-28 NOTE — ASSESSMENT & PLAN NOTE
Has dx of ARIEL in past though has not been CPAP for several years. Would like to be tested again and potentially get set up with new CPAP.

## 2025-04-28 NOTE — PROGRESS NOTES
Laterality Date    TONSILLECTOMY      as a child         Current Outpatient Medications:     vitamin D 50 MCG (2000 UT) CAPS capsule, Take 2 capsules by mouth, Disp: , Rfl:     levothyroxine (SYNTHROID) 75 MCG tablet, Take 1 tablet by mouth daily Take on an empty stomach at least 30 to 60 minutes before breakfast, Disp: 30 tablet, Rfl: 2    buPROPion (WELLBUTRIN XL) 150 MG extended release tablet, TAKE 1 TABLET BY MOUTH EVERY MORNING FOR 14 DAYS THEN TAKE 2 TABLETS BY MOUTH EVERY MORNING, Disp: 90 tablet, Rfl: 1    Ascorbic Acid (VITAMIN C PO), Take by mouth, Disp: , Rfl:     CAFFEINE PO, Take 400 mg by mouth, Disp: , Rfl:   Allergies   Allergen Reactions    Other Anaphylaxis     EGGPLANT, PEPPERS    Ceclor [Cefaclor]        Review of Systems:  Per HPI    Objective:    Physical Exam:  Vitals:    04/28/25 1304   BP: 134/76   BP Site: Left Upper Arm   Patient Position: Sitting   BP Cuff Size: Large Adult   Pulse: 97   Resp: 20   SpO2: 97%   Weight: 131.5 kg (290 lb)   Height: 1.753 m (5' 9\")     Physical Exam    Body mass index is 42.83 kg/m².    Labs:  No results found for this or any previous visit (from the past 4 weeks).    Imaging:  No orders to display       Gume Davey MD  [facility]  04/28/25      This note was generated completely or in part utilizing Dragon dictation speech recognition software.  Occasionally, words are mistranscribed and despite editing, the text may contain inaccuracies due to incorrect word recognition.  If further clarification is needed please contact the office at [phone].

## 2025-04-29 DIAGNOSIS — E03.9 ACQUIRED HYPOTHYROIDISM: Chronic | ICD-10-CM

## 2025-04-29 DIAGNOSIS — R53.83 OTHER FATIGUE: ICD-10-CM

## 2025-04-30 LAB
25(OH)D3 SERPL-MCNC: 38.9 NG/ML
ALBUMIN SERPL-MCNC: 4.4 G/DL (ref 3.4–5)
ALBUMIN/GLOB SERPL: 1.6 {RATIO} (ref 1.1–2.2)
ALP SERPL-CCNC: 54 U/L (ref 40–129)
ALT SERPL-CCNC: 27 U/L (ref 10–40)
ANION GAP SERPL CALCULATED.3IONS-SCNC: 11 MMOL/L (ref 3–16)
AST SERPL-CCNC: 26 U/L (ref 15–37)
BASOPHILS # BLD: 0 K/UL (ref 0–0.2)
BASOPHILS NFR BLD: 0.5 %
BILIRUB SERPL-MCNC: 0.4 MG/DL (ref 0–1)
BUN SERPL-MCNC: 19 MG/DL (ref 7–20)
CALCIUM SERPL-MCNC: 9.7 MG/DL (ref 8.3–10.6)
CHLORIDE SERPL-SCNC: 103 MMOL/L (ref 99–110)
CO2 SERPL-SCNC: 24 MMOL/L (ref 21–32)
CREAT SERPL-MCNC: 0.8 MG/DL (ref 0.9–1.3)
DEPRECATED RDW RBC AUTO: 13.6 % (ref 12.4–15.4)
EOSINOPHIL # BLD: 0.2 K/UL (ref 0–0.6)
EOSINOPHIL NFR BLD: 2.9 %
GFR SERPLBLD CREATININE-BSD FMLA CKD-EPI: >90 ML/MIN/{1.73_M2}
GLUCOSE SERPL-MCNC: 92 MG/DL (ref 70–99)
HCT VFR BLD AUTO: 42.6 % (ref 40.5–52.5)
HGB BLD-MCNC: 14.3 G/DL (ref 13.5–17.5)
LYMPHOCYTES # BLD: 2.2 K/UL (ref 1–5.1)
LYMPHOCYTES NFR BLD: 28.6 %
MCH RBC QN AUTO: 28.7 PG (ref 26–34)
MCHC RBC AUTO-ENTMCNC: 33.6 G/DL (ref 31–36)
MCV RBC AUTO: 85.4 FL (ref 80–100)
MONOCYTES # BLD: 0.5 K/UL (ref 0–1.3)
MONOCYTES NFR BLD: 7.2 %
NEUTROPHILS # BLD: 4.6 K/UL (ref 1.7–7.7)
NEUTROPHILS NFR BLD: 60.8 %
PLATELET # BLD AUTO: 299 K/UL (ref 135–450)
PMV BLD AUTO: 9.8 FL (ref 5–10.5)
POTASSIUM SERPL-SCNC: 4.6 MMOL/L (ref 3.5–5.1)
PROT SERPL-MCNC: 7.2 G/DL (ref 6.4–8.2)
RBC # BLD AUTO: 4.99 M/UL (ref 4.2–5.9)
SODIUM SERPL-SCNC: 138 MMOL/L (ref 136–145)
T4 FREE SERPL-MCNC: 1.1 NG/DL (ref 0.9–1.8)
TSH SERPL DL<=0.005 MIU/L-ACNC: 5.73 UIU/ML (ref 0.27–4.2)
WBC # BLD AUTO: 7.6 K/UL (ref 4–11)

## 2025-05-01 LAB
SHBG SERPL-SCNC: 18 NMOL/L (ref 17–56)
TESTOST FREE SERPL-MCNC: 53.1 PG/ML (ref 47–244)
TESTOST SERPL-MCNC: 205 NG/DL (ref 249–836)

## 2025-05-04 ENCOUNTER — PATIENT MESSAGE (OUTPATIENT)
Dept: FAMILY MEDICINE CLINIC | Age: 35
End: 2025-05-04

## 2025-05-04 DIAGNOSIS — E29.1 HYPOGONADISM IN MALE: Primary | ICD-10-CM

## 2025-05-04 DIAGNOSIS — R53.83 OTHER FATIGUE: ICD-10-CM

## 2025-05-07 RX ORDER — TESTOSTERONE 10 MG/.5G
1 GEL, METERED TOPICAL DAILY
Qty: 1 G | Refills: 0 | Status: SHIPPED | OUTPATIENT
Start: 2025-05-07 | End: 2025-08-15

## 2025-05-07 NOTE — TELEPHONE ENCOUNTER
Called patient to discuss management low testosterone which would include medication and close monitoring. Unable to reach so sent MC message

## 2025-05-15 DIAGNOSIS — R53.83 OTHER FATIGUE: ICD-10-CM

## 2025-05-15 DIAGNOSIS — E29.1 HYPOGONADISM IN MALE: ICD-10-CM

## 2025-05-15 DIAGNOSIS — F33.1 MODERATE EPISODE OF RECURRENT MAJOR DEPRESSIVE DISORDER (HCC): Chronic | ICD-10-CM

## 2025-05-16 DIAGNOSIS — E29.1 HYPOGONADISM IN MALE: ICD-10-CM

## 2025-05-16 DIAGNOSIS — R53.83 OTHER FATIGUE: ICD-10-CM

## 2025-05-16 RX ORDER — BUPROPION HYDROCHLORIDE 150 MG/1
TABLET ORAL
Qty: 90 TABLET | Refills: 1 | Status: SHIPPED | OUTPATIENT
Start: 2025-05-16

## 2025-05-16 RX ORDER — TESTOSTERONE 10 MG/.5G
GEL, METERED TOPICAL
Qty: 60 G | Refills: 0 | Status: SHIPPED | OUTPATIENT
Start: 2025-05-16 | End: 2025-08-24

## 2025-05-16 NOTE — TELEPHONE ENCOUNTER
Medication:   Requested Prescriptions     Pending Prescriptions Disp Refills    Testosterone 10 MG/ACT (2%) GEL [Pharmacy Med Name: TESTOSTERONE 10MG/ACT GEL(120PUMPS)] 60 g 0     Sig: PLACE 1 PUMP ON THE SKIN DAILY AS DIRECTED.        Last Filled:  5/7/2025, 1g, 0    Patient Phone Number: [phone] (home)     Last appt: 4/28/2025   Next appt: 8/14/2025    Last OARRS:        No data to display

## 2025-05-16 NOTE — TELEPHONE ENCOUNTER
Medication:   Requested Prescriptions     Pending Prescriptions Disp Refills    buPROPion (WELLBUTRIN XL) 150 MG extended release tablet [Pharmacy Med Name: BUPROPION XL 150MG TABLETS (24 H)] 90 tablet 1     Sig: TAKE 1 TABLET BY MOUTH EVERY MORNING FOR 14 DAYS THEN TAKE 2 TABLETS BY MOUTH EVERY MORNING        Last Filled:  2/13/2025, 90, 1    Patient Phone Number: [phone] (home)     Last appt: 4/28/2025   Next appt: 8/14/2025    Last OARRS:        No data to display

## 2025-06-13 DIAGNOSIS — E03.9 ACQUIRED HYPOTHYROIDISM: Chronic | ICD-10-CM

## 2025-06-13 RX ORDER — LEVOTHYROXINE SODIUM 75 UG/1
TABLET ORAL
Qty: 30 TABLET | Refills: 2 | Status: SHIPPED | OUTPATIENT
Start: 2025-06-13

## 2025-06-13 NOTE — TELEPHONE ENCOUNTER
Last OV: 4/28/2025  Next OV: 8/14/2025    Next appointment due:    Last fill:03/17/2025  Refills:30/2Medication:   Requested Prescriptions     Pending Prescriptions Disp Refills    levothyroxine (SYNTHROID) 75 MCG tablet [Pharmacy Med Name: LEVOTHYROXINE 0.075MG (75MCG) TABS] 30 tablet 2     Sig: TAKE 1 TABLET BY MOUTH DAILY 30 TO 60 MINUTES BEFORE BREAKFAST ON AN EMPTY STOMACH       Last Filled:      Patient Phone Number: [phone] (home)     Last appt: 4/28/2025   Next appt: 8/14/2025    Last Thyroid:   Lab Results   Component Value Date/Time    TSH 4.40 12/16/2024 01:20 PM    T4FREE 1.1 04/29/2025 11:04 AM

## 2025-08-14 ENCOUNTER — OFFICE VISIT (OUTPATIENT)
Dept: FAMILY MEDICINE CLINIC | Age: 35
End: 2025-08-14
Payer: COMMERCIAL

## 2025-08-14 VITALS
OXYGEN SATURATION: 96 % | DIASTOLIC BLOOD PRESSURE: 88 MMHG | BODY MASS INDEX: 43.86 KG/M2 | HEART RATE: 91 BPM | SYSTOLIC BLOOD PRESSURE: 138 MMHG | WEIGHT: 297 LBS

## 2025-08-14 DIAGNOSIS — E03.9 ACQUIRED HYPOTHYROIDISM: Primary | Chronic | ICD-10-CM

## 2025-08-14 DIAGNOSIS — F41.1 GAD (GENERALIZED ANXIETY DISORDER): Chronic | ICD-10-CM

## 2025-08-14 DIAGNOSIS — F33.1 MODERATE EPISODE OF RECURRENT MAJOR DEPRESSIVE DISORDER (HCC): Chronic | ICD-10-CM

## 2025-08-14 PROCEDURE — G8417 CALC BMI ABV UP PARAM F/U: HCPCS | Performed by: STUDENT IN AN ORGANIZED HEALTH CARE EDUCATION/TRAINING PROGRAM

## 2025-08-14 PROCEDURE — 4004F PT TOBACCO SCREEN RCVD TLK: CPT | Performed by: STUDENT IN AN ORGANIZED HEALTH CARE EDUCATION/TRAINING PROGRAM

## 2025-08-14 PROCEDURE — G8427 DOCREV CUR MEDS BY ELIG CLIN: HCPCS | Performed by: STUDENT IN AN ORGANIZED HEALTH CARE EDUCATION/TRAINING PROGRAM

## 2025-08-14 PROCEDURE — 99214 OFFICE O/P EST MOD 30 MIN: CPT | Performed by: STUDENT IN AN ORGANIZED HEALTH CARE EDUCATION/TRAINING PROGRAM

## 2025-08-24 DIAGNOSIS — F33.1 MODERATE EPISODE OF RECURRENT MAJOR DEPRESSIVE DISORDER (HCC): Chronic | ICD-10-CM

## 2025-08-25 RX ORDER — BUPROPION HYDROCHLORIDE 150 MG/1
TABLET ORAL
Qty: 90 TABLET | Refills: 1 | Status: SHIPPED | OUTPATIENT
Start: 2025-08-25